# Patient Record
Sex: FEMALE | Race: BLACK OR AFRICAN AMERICAN | Employment: PART TIME | URBAN - METROPOLITAN AREA
[De-identification: names, ages, dates, MRNs, and addresses within clinical notes are randomized per-mention and may not be internally consistent; named-entity substitution may affect disease eponyms.]

---

## 2018-05-14 ENCOUNTER — HOSPITAL ENCOUNTER (OUTPATIENT)
Dept: PREADMISSION TESTING | Age: 43
Discharge: HOME | End: 2018-05-14
Payer: MEDICARE

## 2018-05-14 DIAGNOSIS — E10.9 DIABETES MELLITUS TYPE I (HCC): ICD-10-CM

## 2018-05-14 LAB
ALBUMIN SERPL-MCNC: 3.3 G/DL (ref 3.4–5)
ALBUMIN/GLOB SERPL: 0.8 {RATIO} (ref 0.8–1.7)
ALP SERPL-CCNC: 103 U/L (ref 45–117)
ALT SERPL-CCNC: 10 U/L (ref 13–56)
ANION GAP SERPL CALC-SCNC: 8 MMOL/L (ref 3–18)
AST SERPL-CCNC: 11 U/L (ref 15–37)
BILIRUB SERPL-MCNC: 0.5 MG/DL (ref 0.2–1)
BUN SERPL-MCNC: 14 MG/DL (ref 7–18)
BUN/CREAT SERPL: 16 (ref 12–20)
CALCIUM SERPL-MCNC: 8.2 MG/DL (ref 8.5–10.1)
CHLORIDE SERPL-SCNC: 106 MMOL/L (ref 100–108)
CO2 SERPL-SCNC: 26 MMOL/L (ref 21–32)
CREAT SERPL-MCNC: 0.85 MG/DL (ref 0.6–1.3)
EST. AVERAGE GLUCOSE BLD GHB EST-MCNC: 117 MG/DL
GLOBULIN SER CALC-MCNC: 4.2 G/DL (ref 2–4)
GLUCOSE SERPL-MCNC: 169 MG/DL (ref 74–99)
HBA1C MFR BLD: 5.7 % (ref 4.5–5.6)
HCT VFR BLD AUTO: 33.4 % (ref 35–45)
HGB BLD-MCNC: 10.4 G/DL (ref 12–16)
MCH RBC QN AUTO: 23.6 PG (ref 24–34)
MCHC RBC AUTO-ENTMCNC: 31.1 G/DL (ref 31–37)
MCV RBC AUTO: 75.9 FL (ref 74–97)
PLATELET # BLD AUTO: 272 K/UL (ref 135–420)
PMV BLD AUTO: 9.9 FL (ref 9.2–11.8)
POTASSIUM SERPL-SCNC: 3.7 MMOL/L (ref 3.5–5.5)
PROT SERPL-MCNC: 7.5 G/DL (ref 6.4–8.2)
RBC # BLD AUTO: 4.4 M/UL (ref 4.2–5.3)
SODIUM SERPL-SCNC: 140 MMOL/L (ref 136–145)
WBC # BLD AUTO: 9.3 K/UL (ref 4.6–13.2)

## 2018-05-14 PROCEDURE — 36415 COLL VENOUS BLD VENIPUNCTURE: CPT | Performed by: ORTHOPAEDIC SURGERY

## 2018-05-14 PROCEDURE — 85027 COMPLETE CBC AUTOMATED: CPT | Performed by: ORTHOPAEDIC SURGERY

## 2018-05-14 PROCEDURE — 83036 HEMOGLOBIN GLYCOSYLATED A1C: CPT | Performed by: ORTHOPAEDIC SURGERY

## 2018-05-14 PROCEDURE — 80053 COMPREHEN METABOLIC PANEL: CPT | Performed by: ORTHOPAEDIC SURGERY

## 2018-05-14 PROCEDURE — 93005 ELECTROCARDIOGRAM TRACING: CPT

## 2018-05-15 LAB
ATRIAL RATE: 68 BPM
CALCULATED P AXIS, ECG09: 67 DEGREES
CALCULATED R AXIS, ECG10: -48 DEGREES
CALCULATED T AXIS, ECG11: 152 DEGREES
DIAGNOSIS, 93000: NORMAL
P-R INTERVAL, ECG05: 164 MS
Q-T INTERVAL, ECG07: 440 MS
QRS DURATION, ECG06: 90 MS
QTC CALCULATION (BEZET), ECG08: 467 MS
VENTRICULAR RATE, ECG03: 68 BPM

## 2018-05-16 PROBLEM — F32.A DEPRESSION: Chronic | Status: ACTIVE | Noted: 2018-05-16

## 2018-05-16 PROBLEM — G62.9 PERIPHERAL NEUROPATHY: Chronic | Status: ACTIVE | Noted: 2018-05-16

## 2018-05-16 PROBLEM — G56.21 CUBITAL TUNNEL SYNDROME ON RIGHT: Chronic | Status: ACTIVE | Noted: 2018-05-16

## 2018-05-16 PROBLEM — K21.9 ACID REFLUX: Chronic | Status: ACTIVE | Noted: 2018-05-16

## 2018-05-16 PROBLEM — I10 HTN (HYPERTENSION): Chronic | Status: ACTIVE | Noted: 2018-05-16

## 2018-05-16 PROBLEM — G56.01 CARPAL TUNNEL SYNDROME OF RIGHT WRIST: Chronic | Status: ACTIVE | Noted: 2018-05-16

## 2018-05-16 PROBLEM — J45.909 ASTHMA: Chronic | Status: ACTIVE | Noted: 2018-05-16

## 2018-05-16 PROBLEM — E78.00 HIGH CHOLESTEROL: Chronic | Status: ACTIVE | Noted: 2018-05-16

## 2018-05-16 PROBLEM — F41.9 ANXIETY: Chronic | Status: ACTIVE | Noted: 2018-05-16

## 2018-05-16 RX ORDER — SODIUM CHLORIDE, SODIUM LACTATE, POTASSIUM CHLORIDE, CALCIUM CHLORIDE 600; 310; 30; 20 MG/100ML; MG/100ML; MG/100ML; MG/100ML
125 INJECTION, SOLUTION INTRAVENOUS CONTINUOUS
Status: CANCELLED | OUTPATIENT
Start: 2018-05-16 | End: 2018-05-17

## 2018-05-16 NOTE — H&P
History and Physical        Patient: Xenia Jenkins               Sex: female          DOA: (Not on file)         YOB: 1975      Age:  39 y.o.        LOS:  LOS: 0 days        HPI:     Mally Lerner is a 39year old right-handed  female referred here today for a right severe carpal tunnel syndrome/cubital tunnel syndrome/diabetic neuropathy. The patient has been having paresthesias in the right upper extremity now that has not gotten better with relative rest.  The patient has had recent EMGs by Dr. Naina Brown showing a right moderately severe carpal tunnel syndrome and a right ulnar nerve entrapment with axonal loss. The patient also seems to have diabetic neuropathy in the right upper extremity. She finds it does wake her up at night and she has to shake her arm to try to wake it up and she feels it in all five digits. No past medical history on file. No past surgical history on file. No family history on file. Social History     Social History    Marital status:      Spouse name: N/A    Number of children: N/A    Years of education: N/A     Social History Main Topics    Smoking status: Not on file    Smokeless tobacco: Not on file    Alcohol use Not on file    Drug use: Not on file    Sexual activity: Not on file     Other Topics Concern    Not on file     Social History Narrative       Prior to Admission medications    Not on File       Allergies not on file    Review of Systems    Pertinent positives include chest pain, headache, weight change, anxiety, changes in mood, depression, joint pain, muscle weakness, nausea/vomiting and numbness/tingling.   Pertinent negatives include blurred vision, chills, cold, discharge of the eyes, dizziness, double vision, fever, hearing loss, heart murmur, itching of the eyes, palpitations, redness of the eyes, rheumatic fever, ringing in ears, sore throat/hoarseness, abdominal pain, bipolar disorder, bladder/kidney infection, bloody stool, blood in urine, burning sensation, chronic cough, diarrhea, difficulty breathing, difficulty swallowing, fainting, frequent urinating, fracture/dislocation, gas/bloating, gout, hemorrhoids, incontinence, joint stiffness, loss of balance, memory loss, pain on breathing, painful urination, rheumatoid disease, seizure disorder, shortness of breath, sprain/strain, swelling of feet, tendonitis and wheezing. Physical Exam:      There were no vitals taken for this visit. Physical Exam:  Physical exam shows a healthy appearing 39year old [facility] female. The right elbow has a positive Tinel's in the cubital tunnel that is refereed to the 5th finger. She has a positive Tinel's and Phalen's into the hand in digits 1, 2 and 3. She has decreased light touch sensation in all five digits. Physical examination shows that the patients right hand demonstrates no obvious swelling, ecchymosis, or wounds noted. There is no tenderness to palpation anywhere within the wrist or hand. The patient has normal motion in all six directions. The patient has a negative direct carpal compression tests. The patient has a negative Finkelstein maneuver. The patient has good capillary refill. The patient has good  strength of the hand with normal thenar eminence tone. Assessment/Plan     Principal Problem:    Carpal tunnel syndrome of right wrist (5/16/2018)    Active Problems:    High cholesterol (5/16/2018)      Peripheral neuropathy (5/16/2018)      Asthma (5/16/2018)      Anxiety (5/16/2018)      Acid reflux (5/16/2018)      Depression (5/16/2018)      HTN (hypertension) (5/16/2018)      Cubital tunnel syndrome on right (5/16/2018)      Dr. Og Holley asked her to see his  for a right ECTR and right ECuTR. We will use the [employer] system for the hand and the segway system for the right elbow.   She understands the risks, the alternatives, and the benefits including infection, pain, bleeding, and is willing to proceed. 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 He issued [place] to her pharmacy for postoperative pain use.

## 2018-05-17 ENCOUNTER — ANESTHESIA (OUTPATIENT)
Dept: SURGERY | Age: 43
End: 2018-05-17
Payer: MEDICARE

## 2018-05-17 ENCOUNTER — ANESTHESIA EVENT (OUTPATIENT)
Dept: SURGERY | Age: 43
End: 2018-05-17
Payer: MEDICARE

## 2018-05-17 ENCOUNTER — HOSPITAL ENCOUNTER (OUTPATIENT)
Age: 43
Setting detail: OUTPATIENT SURGERY
Discharge: HOME | End: 2018-05-17
Attending: ORTHOPAEDIC SURGERY | Admitting: ORTHOPAEDIC SURGERY
Payer: MEDICARE

## 2018-05-17 VITALS
TEMPERATURE: 98 F | DIASTOLIC BLOOD PRESSURE: 68 MMHG | BODY MASS INDEX: 38.43 KG/M2 | WEIGHT: 239.13 LBS | HEIGHT: 66 IN | HEART RATE: 74 BPM | RESPIRATION RATE: 16 BRPM | SYSTOLIC BLOOD PRESSURE: 140 MMHG | OXYGEN SATURATION: 98 %

## 2018-05-17 PROBLEM — G56.00 CTS (CARPAL TUNNEL SYNDROME): Status: ACTIVE | Noted: 2018-05-17

## 2018-05-17 LAB
GLUCOSE BLD STRIP.AUTO-MCNC: 126 MG/DL (ref 70–110)
GLUCOSE BLD STRIP.AUTO-MCNC: 138 MG/DL (ref 70–110)
GLUCOSE BLD STRIP.AUTO-MCNC: 157 MG/DL (ref 70–110)
HCG SERPL QL: NEGATIVE

## 2018-05-17 PROCEDURE — 77030020782 HC GWN BAIR PAWS FLX 3M -B: Performed by: ORTHOPAEDIC SURGERY

## 2018-05-17 PROCEDURE — 84703 CHORIONIC GONADOTROPIN ASSAY: CPT | Performed by: ORTHOPAEDIC SURGERY

## 2018-05-17 PROCEDURE — 77030012508 HC MSK AIRWY LMA AMBU -A: Performed by: ANESTHESIOLOGY

## 2018-05-17 PROCEDURE — 82962 GLUCOSE BLOOD TEST: CPT

## 2018-05-17 PROCEDURE — 36415 COLL VENOUS BLD VENIPUNCTURE: CPT | Performed by: ORTHOPAEDIC SURGERY

## 2018-05-17 PROCEDURE — 76210000006 HC OR PH I REC 0.5 TO 1 HR: Performed by: ORTHOPAEDIC SURGERY

## 2018-05-17 PROCEDURE — 77030009770 HC INSTRU CRPL SET CNMD -C: Performed by: ORTHOPAEDIC SURGERY

## 2018-05-17 PROCEDURE — 74011250636 HC RX REV CODE- 250/636: Performed by: ORTHOPAEDIC SURGERY

## 2018-05-17 PROCEDURE — 74011000250 HC RX REV CODE- 250: Performed by: ORTHOPAEDIC SURGERY

## 2018-05-17 PROCEDURE — 76210000026 HC REC RM PH II 1 TO 1.5 HR: Performed by: ORTHOPAEDIC SURGERY

## 2018-05-17 PROCEDURE — 77030000032 HC CUF TRNQT ZIMM -B: Performed by: ORTHOPAEDIC SURGERY

## 2018-05-17 PROCEDURE — 74011250637 HC RX REV CODE- 250/637: Performed by: ANESTHESIOLOGY

## 2018-05-17 PROCEDURE — 74011250636 HC RX REV CODE- 250/636

## 2018-05-17 PROCEDURE — 77030011640 HC PAD GRND REM COVD -A: Performed by: ORTHOPAEDIC SURGERY

## 2018-05-17 PROCEDURE — 76060000032 HC ANESTHESIA 0.5 TO 1 HR: Performed by: ORTHOPAEDIC SURGERY

## 2018-05-17 PROCEDURE — 76010000138 HC OR TIME 0.5 TO 1 HR: Performed by: ORTHOPAEDIC SURGERY

## 2018-05-17 PROCEDURE — 74011000250 HC RX REV CODE- 250

## 2018-05-17 PROCEDURE — 74011636637 HC RX REV CODE- 636/637: Performed by: ANESTHESIOLOGY

## 2018-05-17 PROCEDURE — 77030020268 HC MISC GENERAL SUPPLY: Performed by: ORTHOPAEDIC SURGERY

## 2018-05-17 RX ORDER — MIDAZOLAM HYDROCHLORIDE 1 MG/ML
INJECTION, SOLUTION INTRAMUSCULAR; INTRAVENOUS AS NEEDED
Status: DISCONTINUED | OUTPATIENT
Start: 2018-05-17 | End: 2018-05-17 | Stop reason: HOSPADM

## 2018-05-17 RX ORDER — DIPHENHYDRAMINE HYDROCHLORIDE 50 MG/ML
12.5 INJECTION, SOLUTION INTRAMUSCULAR; INTRAVENOUS
Status: DISCONTINUED | OUTPATIENT
Start: 2018-05-17 | End: 2018-05-17 | Stop reason: HOSPADM

## 2018-05-17 RX ORDER — LIDOCAINE HYDROCHLORIDE 20 MG/ML
INJECTION, SOLUTION EPIDURAL; INFILTRATION; INTRACAUDAL; PERINEURAL AS NEEDED
Status: DISCONTINUED | OUTPATIENT
Start: 2018-05-17 | End: 2018-05-17 | Stop reason: HOSPADM

## 2018-05-17 RX ORDER — SODIUM CHLORIDE 0.9 % (FLUSH) 0.9 %
5-10 SYRINGE (ML) INJECTION AS NEEDED
Status: DISCONTINUED | OUTPATIENT
Start: 2018-05-17 | End: 2018-05-17 | Stop reason: HOSPADM

## 2018-05-17 RX ORDER — FENTANYL CITRATE 50 UG/ML
INJECTION, SOLUTION INTRAMUSCULAR; INTRAVENOUS AS NEEDED
Status: DISCONTINUED | OUTPATIENT
Start: 2018-05-17 | End: 2018-05-17 | Stop reason: HOSPADM

## 2018-05-17 RX ORDER — INSULIN LISPRO 100 [IU]/ML
INJECTION, SOLUTION INTRAVENOUS; SUBCUTANEOUS ONCE
Status: DISCONTINUED | OUTPATIENT
Start: 2018-05-17 | End: 2018-05-17 | Stop reason: HOSPADM

## 2018-05-17 RX ORDER — DEXTROSE 50 % IN WATER (D50W) INTRAVENOUS SYRINGE
25-50 AS NEEDED
Status: DISCONTINUED | OUTPATIENT
Start: 2018-05-17 | End: 2018-05-17 | Stop reason: HOSPADM

## 2018-05-17 RX ORDER — ONDANSETRON 2 MG/ML
INJECTION INTRAMUSCULAR; INTRAVENOUS AS NEEDED
Status: DISCONTINUED | OUTPATIENT
Start: 2018-05-17 | End: 2018-05-17 | Stop reason: HOSPADM

## 2018-05-17 RX ORDER — DIPHENHYDRAMINE HCL 25 MG
25 CAPSULE ORAL
Status: DISCONTINUED | OUTPATIENT
Start: 2018-05-17 | End: 2018-05-17 | Stop reason: HOSPADM

## 2018-05-17 RX ORDER — SODIUM CHLORIDE, SODIUM LACTATE, POTASSIUM CHLORIDE, CALCIUM CHLORIDE 600; 310; 30; 20 MG/100ML; MG/100ML; MG/100ML; MG/100ML
150 INJECTION, SOLUTION INTRAVENOUS CONTINUOUS
Status: DISCONTINUED | OUTPATIENT
Start: 2018-05-17 | End: 2018-05-17 | Stop reason: HOSPADM

## 2018-05-17 RX ORDER — SODIUM CHLORIDE 0.9 % (FLUSH) 0.9 %
5-10 SYRINGE (ML) INJECTION EVERY 8 HOURS
Status: DISCONTINUED | OUTPATIENT
Start: 2018-05-17 | End: 2018-05-17 | Stop reason: HOSPADM

## 2018-05-17 RX ORDER — SODIUM CHLORIDE, SODIUM LACTATE, POTASSIUM CHLORIDE, CALCIUM CHLORIDE 600; 310; 30; 20 MG/100ML; MG/100ML; MG/100ML; MG/100ML
125 INJECTION, SOLUTION INTRAVENOUS CONTINUOUS
Status: DISCONTINUED | OUTPATIENT
Start: 2018-05-17 | End: 2018-05-17 | Stop reason: HOSPADM

## 2018-05-17 RX ORDER — NALOXONE HYDROCHLORIDE 0.4 MG/ML
0.1 INJECTION, SOLUTION INTRAMUSCULAR; INTRAVENOUS; SUBCUTANEOUS AS NEEDED
Status: DISCONTINUED | OUTPATIENT
Start: 2018-05-17 | End: 2018-05-17 | Stop reason: HOSPADM

## 2018-05-17 RX ORDER — OXYCODONE HYDROCHLORIDE 5 MG/1
5 TABLET ORAL ONCE
Status: COMPLETED | OUTPATIENT
Start: 2018-05-17 | End: 2018-05-17

## 2018-05-17 RX ORDER — PROPOFOL 10 MG/ML
INJECTION, EMULSION INTRAVENOUS AS NEEDED
Status: DISCONTINUED | OUTPATIENT
Start: 2018-05-17 | End: 2018-05-17 | Stop reason: HOSPADM

## 2018-05-17 RX ORDER — ALBUTEROL SULFATE 0.83 MG/ML
2.5 SOLUTION RESPIRATORY (INHALATION) AS NEEDED
Status: DISCONTINUED | OUTPATIENT
Start: 2018-05-17 | End: 2018-05-17 | Stop reason: HOSPADM

## 2018-05-17 RX ORDER — FENTANYL CITRATE 50 UG/ML
25 INJECTION, SOLUTION INTRAMUSCULAR; INTRAVENOUS AS NEEDED
Status: DISCONTINUED | OUTPATIENT
Start: 2018-05-17 | End: 2018-05-17 | Stop reason: HOSPADM

## 2018-05-17 RX ORDER — BUPIVACAINE HYDROCHLORIDE 2.5 MG/ML
INJECTION, SOLUTION EPIDURAL; INFILTRATION; INTRACAUDAL AS NEEDED
Status: DISCONTINUED | OUTPATIENT
Start: 2018-05-17 | End: 2018-05-17 | Stop reason: HOSPADM

## 2018-05-17 RX ORDER — MAGNESIUM SULFATE 100 %
4 CRYSTALS MISCELLANEOUS AS NEEDED
Status: DISCONTINUED | OUTPATIENT
Start: 2018-05-17 | End: 2018-05-17 | Stop reason: HOSPADM

## 2018-05-17 RX ORDER — INSULIN LISPRO 100 [IU]/ML
INJECTION, SOLUTION INTRAVENOUS; SUBCUTANEOUS ONCE
Status: COMPLETED | OUTPATIENT
Start: 2018-05-17 | End: 2018-05-17

## 2018-05-17 RX ORDER — ONDANSETRON 2 MG/ML
4 INJECTION INTRAMUSCULAR; INTRAVENOUS ONCE
Status: DISCONTINUED | OUTPATIENT
Start: 2018-05-17 | End: 2018-05-17 | Stop reason: HOSPADM

## 2018-05-17 RX ORDER — HYDROCODONE BITARTRATE AND ACETAMINOPHEN 5; 325 MG/1; MG/1
1 TABLET ORAL AS NEEDED
Status: DISCONTINUED | OUTPATIENT
Start: 2018-05-17 | End: 2018-05-17 | Stop reason: HOSPADM

## 2018-05-17 RX ADMIN — PROPOFOL 200 MG: 10 INJECTION, EMULSION INTRAVENOUS at 11:56

## 2018-05-17 RX ADMIN — INSULIN LISPRO 2 UNITS: 100 INJECTION, SOLUTION INTRAVENOUS; SUBCUTANEOUS at 09:39

## 2018-05-17 RX ADMIN — LIDOCAINE HYDROCHLORIDE 60 MG: 20 INJECTION, SOLUTION EPIDURAL; INFILTRATION; INTRACAUDAL; PERINEURAL at 11:56

## 2018-05-17 RX ADMIN — SODIUM CHLORIDE, SODIUM LACTATE, POTASSIUM CHLORIDE, AND CALCIUM CHLORIDE 125 ML/HR: 600; 310; 30; 20 INJECTION, SOLUTION INTRAVENOUS at 08:49

## 2018-05-17 RX ADMIN — MIDAZOLAM HYDROCHLORIDE 2 MG: 1 INJECTION, SOLUTION INTRAMUSCULAR; INTRAVENOUS at 11:51

## 2018-05-17 RX ADMIN — ONDANSETRON 4 MG: 2 INJECTION INTRAMUSCULAR; INTRAVENOUS at 12:07

## 2018-05-17 RX ADMIN — OXYCODONE HYDROCHLORIDE 5 MG: 5 TABLET ORAL at 13:57

## 2018-05-17 RX ADMIN — FENTANYL CITRATE 100 MCG: 50 INJECTION, SOLUTION INTRAMUSCULAR; INTRAVENOUS at 11:51

## 2018-05-17 NOTE — PERIOP NOTES
TRANSFER - OUT REPORT:    Verbal report given to JOHN Perez on Terrell Matta  being transferred to Phase II for routine progression of care       Report consisted of patients Situation, Background, Assessment and   Recommendations(SBAR). Information from the following report(s) SBAR was reviewed with the receiving nurse. Lines:   Peripheral IV 05/17/18 Left Hand (Active)   Site Assessment Clean, dry, & intact 5/17/2018 12:47 PM   Phlebitis Assessment 0 5/17/2018 12:47 PM   Infiltration Assessment 0 5/17/2018 12:47 PM   Dressing Status Clean, dry, & intact 5/17/2018 12:47 PM   Dressing Type Transparent;Tape 5/17/2018 12:47 PM   Hub Color/Line Status Pink; Infusing 5/17/2018 12:47 PM        Opportunity for questions and clarification was provided.       Patient transported with:   [employer]

## 2018-05-17 NOTE — OP NOTES
ENDOSCOPIC CARPAL & CUBITAL TUNNEL  RELEASE     Patient: Pratibha Laughlin MRN: [id number]  SSN: [id number]    YOB: 1975  Age: 39 y.o. Sex: female          Date of Procedure: 5/17/2018     Preoperative Diagnosis:  Carpal & Cubital Tunnel Syndrome    Postoperative Diagnosis:  Carpal & Cubital Tunnel Syndrome     Procedure: right Endoscopic Carpal & Cubital Tunnel Release    Surgeon:  Agustín Dias III, MD     Assistant: Mickie Fuentes PA-C    Anesthesia: General    Estimated Blood Loss: Minimal    Tourniquet Time:   16   minutes at 250mmHg. Specimens: None    Complications: None    Indications: This is a 39y.o. year-old female who presents with known Carpal & Cubital Tunnel Syndrome documented by EMGs and clinical exam who now presents for surgical correction due to inability to treat the patients problem conservatively. PROCEDURE:The patient was brought to the  operating theater and after adequate anesthesia, the correct upper  extremity was prepped and draped in the typical sterile fashion. The  limb was exsanguinated with an Esmarch bandage and the tourniquet  inflated to 250 mmHg. A 1 inch long straight incision was placed just  behind the medial epicondyle over the cubital tunnel. The incision was  taken down to the subcutaneous tissue to the fascia just above the  ulnar nerve. Using a curved tenotomy scissors the soft tissue  was dissected off the superficial fascia distally and proximally and then  I used [place] scissors to dissect soft tissue off the fascia superficially  for a distance of about 8 cm distally and 8 cm proximally. I went back  to the cubital tunnel and using the pickups and tenotomy scissors, I  opened up the sheath around the ulnar nerve( Osbornes fascia). Dissection was carried  out circumferentially and it was freed in the cubital tunnel.  I used the  large dilator on the [employer] instrumentation system and dilated  the sheath for the ulnar nerve distally to the full depth and  proximally to the full depth. I then assembled the upper and lower  endoscopic cubital tunnel release guides and put lower part underneath  the sheath of the cubital tunnel and once it was underneath the upper  guide was then on top of the fascia. This was then advanced down  until it was fully seated. I placed the scope in the upper guide and  viewed the fascia from above, which showed good view of the fascia  entirely. I then viewed from the lower guide and looked upwards at the  fascia which showed the fascia throughout as well with no nerve  entrapment. I then rotated the cannula around and through the small  slit in the back you could see the ulnar nerve protected and out of  harm's way. The antegrade knife was then placed in the knife channel  of the guide and advancing the scope at the same time as knife it was  advanced completely to the distal extent with full release of the fascia. The guide was then removed and the exact same technique was  carried out proximally going in the upper arm. Once it was released  fully I could place the guide system back in the same area with no  tension as there was before. There was no subluxation of the ulnar  nerve with flexion. The wound was then irrigated out. The skin was  closed with inverted 4-0 Monocryl, and then Mastisol was applied to  the skin and half-inch Steri-Strips were applied. Approximately 10 mL  of 0.25% Marcaine with epinephrine was injected in the skin, distal and  proximal. This was then dressed with 4 x 4's and an Ace wrap. A 1 cm transverse incision was placed at the volar flexion wrist crease centered over the palmaris longus. The incision was deepened to the antebrachial fascia which was released the length of the wound and proximally for an additional 1 cm. Using the [facility] carpal tunnel release kit, the carpal tunnel was dilated in line with the fourth metacarpal to the distal extent of the transverse carpal ligament.   The cannula was then inserted and kept on some proximal radial deviation with the wrist in slight extension, and it was passed all the way to the cardinal line of Castro. The scope was then inserted with the wrist in slight extension and the undersurface of the transverse carpal ligament was seen easily from proximal to the distal fatty/adipose tissue at the end of the transverse carpal ligament. Using the [facility] on the [facility] set and using the endoscope for visualization, the transverse carpal ligament was transected in two passes with good release of the ligament outside of view of the cannula. The nerve was never in harms way. The cannula was then withdrawn and placed back in the carpal tunnel with none of the prerelease tension. The wound was then closed with Mastisol on either side and then Steri-Strips were used to re-oppose the skin without the use of stitches. The skin and cut ends of the transverse carpal ligament were anesthetized with 4 to 5 mL of 0.25% Marcaine without epinephrine. This was dressed with two 4 x 4s and an Ace wrap. The tourniquet was deflated and removed, and the patient taken to the recovery room awake and in stable condition. All instrument, sponge and needle counts were correct.

## 2018-05-17 NOTE — INTERVAL H&P NOTE
H&P Update:  Clarence Montana was seen and examined. History and physical has been reviewed. The patient has been examined. There have been no significant clinical changes since the completion of the originally dated History and Physical.  Patient identified by surgeon; surgical site was confirmed by patient and surgeon.     Signed By: Nirav Blanco MD     May 17, 2018 9:43 AM

## 2018-05-17 NOTE — ANESTHESIA PREPROCEDURE EVALUATION
Anesthetic History   No history of anesthetic complications            Review of Systems / Medical History  Patient summary reviewed, nursing notes reviewed and pertinent labs reviewed    Pulmonary            Asthma : well controlled       Neuro/Psych         Psychiatric history     Cardiovascular    Hypertension      CHF        Exercise tolerance: >4 METS     GI/Hepatic/Renal     GERD           Endo/Other    Diabetes: type 2    Arthritis     Other Findings            Physical Exam    Airway  Mallampati: II  TM Distance: 4 - 6 cm  Neck ROM: normal range of motion   Mouth opening: Normal     Cardiovascular  Regular rate and rhythm,  S1 and S2 normal,  no murmur, click, rub, or gallop             Dental  No notable dental hx       Pulmonary  Breath sounds clear to auscultation               Abdominal  GI exam deferred       Other Findings            Anesthetic Plan    ASA: 3  Anesthesia type: general          Induction: Intravenous  Anesthetic plan and risks discussed with: Patient

## 2018-05-17 NOTE — PERIOP NOTES
Reviewed PTA medication list with patient/caregiver and patient/caregiver denies any additional medications. Patient admits to having a responsible adult care for them for at least 24 hours after surgery.     Dual skin assessment completed by Prosper LOPEZ and DOREEN Layton RN.

## 2018-05-17 NOTE — ANESTHESIA POSTPROCEDURE EVALUATION
Post-Anesthesia Evaluation & Assessment    Visit Vitals    /77    Pulse 78    Temp 36.9 °C (98.5 °F)    Resp 20    Ht 5' 6\" (1.676 m)    Wt 108.5 kg (239 lb 2 oz)    SpO2 96%    BMI 38.6 kg/m2       Nausea/Vomiting: no nausea    Post-operative hydration adequate. Pain score (VAS): 2    Mental status & Level of consciousness: alert and oriented x 3    Neurological status: moves all extremities, sensation grossly intact    Pulmonary status: airway patent, no supplemental oxygen required    Complications related to anesthesia: none    Patient has met all discharge requirements.     Additional comments:        Yelena Byrd MD

## 2018-05-17 NOTE — PERIOP NOTES
TRANSFER - IN REPORT:    Verbal report received from OR [facility]culator on Thalia Koffi  being received from OR for routine post - op      Report consisted of patients Situation, Background, Assessment and   Recommendations(SBAR). Information from the following report(s) SBAR was reviewed with the receiving nurse. Opportunity for questions and clarification was provided. Assessment completed upon patients arrival to unit and care assumed.

## 2018-05-17 NOTE — IP AVS SNAPSHOT
[address] [address] 
[phone] Patient: Xenia Jenkins MRN: [id number] About your hospitalization You were admitted on:  May 17, 2018 [facility] You were discharged on:  May 17, 2018 Why you were hospitalized Your primary diagnosis was:  Carpal Tunnel Syndrome Of Right Wrist  
 Your diagnoses also included:  High Cholesterol, Peripheral Neuropathy, Asthma, Anxiety, Acid Reflux, Depression, Htn (Hypertension), Cubital Tunnel Syndrome On Right, Cts (Carpal Tunnel Syndrome) Follow-up Information Follow up With Details Comments Contact Info Celestina Sy MD   [id number] [facility] A [address] [place] [address] 
[phone] Discharge Orders None A check esdras indicates which time of day the medication should be taken. My Medications CONTINUE taking these medications Instructions Each Dose to Equal  
 Morning Noon Evening Bedtime  
 aspirin delayed-release 81 mg tablet Your last dose was: Your next dose is: Take 81 mg by mouth daily. 81 mg  
    
   
   
   
  
 atorvastatin 40 mg tablet Commonly known as:  LIPITOR Your last dose was: Your next dose is: Take 40 mg by mouth daily. 40 mg  
    
   
   
   
  
 cyclobenzaprine 10 mg tablet Commonly known as:  FLEXERIL Your last dose was: Your next dose is: Take 10 mg by mouth nightly. 10 mg FLUoxetine 20 mg capsule Commonly known as:  PROzac Your last dose was: Your next dose is: Take 20 mg by mouth daily. 20 mg  
    
   
   
   
  
 furosemide 20 mg tablet Commonly known as:  LASIX Your last dose was: Your next dose is: Take 20 mg by mouth two (2) times a day.   
 20 mg  
    
   
   
   
  
 HumaLOG U-100 Insulin 100 unit/mL injection Generic drug:  insulin lispro Your last dose was: Your next dose is:    
   
   
 by SubCUTAneous route Before breakfast, lunch, and dinner. Sliding scale  
     
   
   
   
  
 hydroCHLOROthiazide 25 mg tablet Commonly known as:  HYDRODIURIL Your last dose was: Your next dose is: Take 25 mg by mouth daily. 25 mg  
    
   
   
   
  
 isosorbide mononitrate ER 30 mg tablet Commonly known as:  IMDUR Your last dose was: Your next dose is: Take 30 mg by mouth daily. 30 mg  
    
   
   
   
  
 LANTUS SOLOSTAR U-100 INSULIN 100 unit/mL (3 mL) Inpn Generic drug:  insulin glargine Your last dose was: Your next dose is:    
   
   
 42 Units by SubCUTAneous route nightly. 42 Units  
    
   
   
   
  
 lisinopril 40 mg tablet Commonly known as:  Chuck Littler Your last dose was: Your next dose is: Take 40 mg by mouth daily. 40 mg  
    
   
   
   
  
 metFORMIN 1,000 mg tablet Commonly known as:  GLUCOPHAGE Your last dose was: Your next dose is: Take 1,000 mg by mouth two (2) times daily (with meals). 1000 mg  
    
   
   
   
  
 nitroglycerin 0.4 mg SL tablet Commonly known as:  NITROSTAT Your last dose was: Your next dose is: 0.4 mg by SubLINGual route every five (5) minutes as needed for Chest Pain. Up to 3 doses. 0.4 mg  
    
   
   
   
  
 nortriptyline 50 mg capsule Commonly known as:  PAMELOR Your last dose was: Your next dose is: Take 100 mg by mouth two (2) times a day. 100 mg  
    
   
   
   
  
 NORVASC 10 mg tablet Generic drug:  amLODIPine Your last dose was: Your next dose is: Take 10 mg by mouth daily. 10 mg  
    
   
   
   
  
 OTHER Your last dose was: Your next dose is: Iron injection weekly PROAIR HFA 90 mcg/actuation inhaler Generic drug:  albuterol Your last dose was: Your next dose is: Take 2 Puffs by inhalation every four (4) hours as needed for Wheezing. 2 Puff SINGULAIR 10 mg tablet Generic drug:  montelukast  
   
Your last dose was: Your next dose is: Take 10 mg by mouth daily. 10 mg  
    
   
   
   
  
 spironolactone 25 mg tablet Commonly known as:  ALDACTONE Your last dose was: Your next dose is: Take 25 mg by mouth two (2) times a day. 25 mg  
    
   
   
   
  
 SYMBICORT 160-4.5 mcg/actuation Hfaa Generic drug:  budesonide-formoterol Your last dose was: Your next dose is: Take 2 Puffs by inhalation two (2) times a day. 2 Puff VITAMIN B-12 1,000 mcg sublingual tablet Generic drug:  cyanocobalamin Your last dose was: Your next dose is: Take 1,000 mcg by mouth daily. 1000 mcg Discharge Instructions Lora Buenrostro III, MD Cindra Bud, PA-C Upper Extremity Surgery Discharge Instructions Please take the time to review the following instructions before you leave the hospital and use them as guidelines during your recovery from surgery. If you have any questions you may contact my office at [phone]. Wound Care/Dressing Changes: 
 
[]   You may remove the bulky dressing two days after surgery. Once you remove this, no dressing is necessary if there is no drainage. [x]   You may change your dressing as needed. Beginning the 2 days after you are discharged from the hospital you should change your dressing daily. A big, bulky dressing isnt necessary as long as there is any drainage from the incisions.   You can put a band-aid or a piece of gauze over each incision and wear an ACE bandage as needed for comfort and swelling. []   Dont remove your dressing or get them wet. ? It isnt necessary to apply antibiotic ointment to your incisions. Sutures will be removed at your one week post-op visit. Staples (if you have them) are removed in two weeks. If you have steri-strips over your incision they will start to peel off in 7-10 days as you get them wet. They dont need to be removed prior to that. When they begin to peel off, you may remove them. They should all be removed by 14 days from your surgery. Showering/Bathing: 
 
[x]   You may shower 2 days after your surgery. Your dressing may be removed for showering. You may get your incisions wet in the shower. Dont vigorously scrub your incisions. Apply a clean, dry dressing after you have dried your incisions. Do not take a bath or get into a swimming pool or Jacuzzi until the incisions are completely healed. This may take about 14 days. Do not soak your incision under water. Sling: 
 
[x]   You are not required to wear your sling and should do so only as needed for comfort. You have no restrictions with regards to the movement of your shoulder. Please push to achieve full range of motion as soon as possible. You may resume your normal daily activities immediately and return to work as soon as you feel appropriate. 
 
[]   Keep your arm in the immobilizer at all times except when showering and changing your clothes. When showering or changing, keep your arm at your side. Dont move it away from your body. []   Keep your arm in the immobilizer at all times except when showering, changing your clothes and doing the exercises shown to you by Dr. Flory Mar or your physical therapist prior to your discharge from the hospital.  Keep your arm at your side when changing your clothes and showering. Dont move it away from your body. Ice/Elevation Continue ice consistently for 48 hours after surgery. After 48 hours, you should ice your shoulder 3 times per day, for 20 minutes at a time for the next 5 days. After one week from surgery, you may use ice as needed for pain and swelling. Diet: 
 
You may advance to your regular diet as tolerated. Medication: 
 
1. You will be given a prescription for pain medication when you are discharged from the hospital.  Take the medication as needed according to the directions on the prescription bottle. Possible side effects of the medication include dizziness, headache, nausea, vomiting, constipation and urinary retention. If you experience any of these side effects call the office so that we can assist you in relieving them. Discontinue the use of the pain medication if you develop itching, rash, shortness of breath or difficulties swallowing. If these symptoms become severe or arent relieved by discontinuing the medication you should seek immediate medical attention. Refills of pain medication are authorized during office hours only (8 AM-5PM Mon. thru Fri.). 2. If you were prescribed Percoset/oxycodone or Dilaudid/hydromorphone you must have a written prescription. These medications legally cannot be called in to the pharmacy. 3. You may take over the counter Ibuprofen/Advil/Aleve between dosages of your pain medication if needed. Do not take Tylenol in addition to your pain medication as most of the pain medication already contains Tylenol. Do not exceed 3000mg of Tylenol per day. Ex: (hydrocodone 5/325g= 325mg of Tylenol) 4. You may resume the medication you were taking prior to surgery. Pain medication may change the effects of any antidepressant medication you are taking. If you have any questions about possible interactions between your regular medications and the pain medication you should consult the physician who prescribes your regular medications. Follow Up Appointment: If you are unsure of your follow-up appointment date and time, please call [phone]. Please let our  know you are scheduling a post-op appointment. Most appointments should be between 7-14 days after your surgery. Physical Therapy: 
 
[]    If you already have a therapy appointment, please be sure to attend your sessions as scheduled. []   Physical Therapy will be discussed with you at your first follow-up appointment with Dr. Lizette Kaur. You dont need to begin physical therapy prior to that. 
 
[]  Begin physical therapy with your Home Health Physical Therapy. This will be set up         for your before you leave the hospital. 
 
[x]  You do not require Physical Therapy. Important Signs and Symptoms: 
 
If any of the following signs and symptoms occurs, you should contact Dr. Lizette Kaur office. Please be advised if a problem arises which you feel requires immediate medical attention or you are unable to contact Dr. Lizette Kaur office you should seek immediate medical attention. Signs and symptoms to watch for include: 1. A sudden increase in swelling and/or redness or warmth at the area your surgery was performed which isnt relieved by rest, ice, and elevation. 2. Oral temperature greater than 101 degrees for 12 hours or more which isnt relieved by an increase in fluid intake and taking two Tylenol every 4-6 hours. 3. Excessive drainage from your incisions, or drainage which hasnt stopped by 72 hours after your surgery. 4. Fever, chills, shortness of breath, chest pain, nausea, vomiting or other signs and symptoms which are of concern to you. DISCHARGE SUMMARY from Nurse PATIENT INSTRUCTIONS: 
 
 
F-face looks uneven A-arms unable to move or move unevenly S-speech slurred or non-existent T-time-call 911 as soon as signs and symptoms begin-DO NOT go Back to bed or wait to see if you get better-TIME IS BRAIN. Warning Signs of HEART ATTACK Call 911 if you have these symptoms: 
? Chest discomfort. Most heart attacks involve discomfort in the center of the chest that lasts more than a few minutes, or that goes away and comes back. It can feel like uncomfortable pressure, squeezing, fullness, or pain. ? Discomfort in other areas of the upper body. Symptoms can include pain or discomfort in one or both arms, the back, neck, jaw, or stomach. ? Shortness of breath with or without chest discomfort. ? Other signs may include breaking out in a cold sweat, nausea, or lightheadedness. Don't wait more than five minutes to call [address]! Fast action can save your life. Calling 911 is almost always the fastest way to get lifesaving treatment. Emergency Medical Services staff can begin treatment when they arrive  up to an hour sooner than if someone gets to the hospital by car. Patient armband removed and shredded The discharge information has been reviewed with the patient and caregiver. The patient and caregiver verbalized understanding. Discharge medications reviewed with the patient and caregiver and appropriate educational materials and side effects teaching were provided. ___________________________________________________________________________________________________________________________________ Introducing [facility]! [facility] introduces Couch[employer].com patient portal. Now you can access parts of your medical record, email your doctor's office, and request medication refills online. 1. In your internet browser, go to [email or web address] "[email or web address]/[email or web address] 2. Click on the First Time User? Click Here link in the Sign In box. You will see the New Member Sign Up page. 3. Enter your [employer] Access Code exactly as it appears below. You will not need to use this code after youve completed the sign-up process. If you do not sign up before the expiration date, you must request a new code. · [employer] Access Code: [email or web address] Expires: 8/14/2018  9:29 AM 
 
4. Enter the last four digits of your Social Security Number (xxxx) and Date of Birth (mm/dd/yyyy) as indicated and click Submit. You will be taken to the next sign-up page. 5. Create a [email or web address] ID. This will be your [employer] login ID and cannot be changed, so think of one that is secure and easy to remember. 6. Create a [employer] password. You can change your password at any time. 7. Enter your Password Reset Question and Answer. This can be used at a later time if you forget your password. 8. Enter your e-mail address. You will receive e-mail notification when new information is available in [address]. 9. Click Sign Up. You can now view and download portions of your medical record. 10. Click the Download Summary menu link to download a portable copy of your medical information. If you have questions, please visit the Frequently Asked Questions section of the [employer] website. Remember, [employer] is NOT to be used for urgent needs. For medical emergencies, dial 911. Now available from your iPhone and Android! Introducing Rosendo Santoro As a [facility] patient, I wanted to make you aware of our electronic visit tool called Rosendo Hugojulian. [employer] 24/7 allows you to connect within minutes with a medical provider 24 hours a day, seven days a week via a mobile device or tablet or logging into a secure website from your computer. You can access Rosendo Ariaslubnafin from anywhere in the [place].  
 
A virtual visit might be right for you when you have a simple condition and feel like you just dont want to get out of bed, or cant get away from work for an appointment, when your regular [employer] provider is not available (evenings, weekends or holidays), or when youre out of town and need minor care. Electronic visits cost only $49 and if the [employer] 24/7 provider determines a prescription is needed to treat your condition, one can be electronically transmitted to a nearby pharmacy*. Please take a moment to enroll today if you have not already done so. The enrollment process is free and takes just a few minutes. To enroll, please download the [employer] 24/7 yelena to your tablet or phone, or visit [email or web address] to enroll on your computer. And, as an [address] [place] [address] patient with a [employer] account, the results of your visits will be scanned into your electronic medical record and your primary care provider will be able to view the scanned results. We urge you to continue to see your regular [employer] provider for your ongoing medical care. And while your primary care provider may not be the one available when you seek a [employer] virtual visit, the peace of mind you get from getting a real diagnosis real time can be priceless. For more information on [employer], view our Frequently Asked Questions (FAQs) at [email or web address]. Sincerely, 
 
Marion Caba MD 
Chief Medical Officer [address] *:  certain medications cannot be prescribed via [employer] Providers Seen During Your Hospitalization Provider Specialty Primary office phone Lacie Silveira, [place] Orthopedic Surgery [phone] Your Primary Care Physician (PCP) Primary Care Physician Office Phone Office Fax Aneta Parrish [phone] You are allergic to the following Allergen Reactions Penicillins Anaphylaxis Tomato Hives Recent Documentation Height Weight BMI OB Status Smoking Status 1.676 m 108.5 kg 38.6 kg/m2 Having regular periods Former Smoker Emergency Contacts Name Discharge Info Relation Home Work Mobile Elier Tim DISCHARGE CAREGIVER [3] Other Relative [6]   [phone] DuniaRegi DISCHARGE CAREGIVER [3] Mother [14]   [phone] Patient Belongings The following personal items are in your possession at time of discharge: 
  Dental Appliances: None         Home Medications: None   Jewelry: None  Clothing: Footwear, Dress, Undergarments, Sweater (7)    Other Valuables: None Please provide this summary of care documentation to your next provider. Signatures-by signing, you are acknowledging that this After Visit Summary has been reviewed with you and you have received a copy. Patient Signature:  ____________________________________________________________ Date:  ____________________________________________________________  
  
Soco Piety Provider Signature:  ____________________________________________________________ Date:  ____________________________________________________________

## 2018-05-17 NOTE — DISCHARGE INSTRUCTIONS
Randi Francis III, MD Kae Miller, PA-C    Upper Extremity Surgery  Discharge Instructions      Please take the time to review the following instructions before you leave the hospital and use them as guidelines during your recovery from surgery. If you have any questions you may contact my office at [phone]. Wound Care/Dressing Changes:    []   You may remove the bulky dressing two days after surgery. Once you remove this, no dressing is necessary if there is no drainage. [x]   You may change your dressing as needed. Beginning the 2 days after you are discharged from the hospital you should change your dressing daily. A big, bulky dressing isnt necessary as long as there is any drainage from the incisions. You can put a band-aid or a piece of gauze over each incision and wear an ACE bandage as needed for comfort and swelling. []   Dont remove your dressing or get them wet.  It isnt necessary to apply antibiotic ointment to your incisions. Sutures will be removed at your one week post-op visit. Staples (if you have them) are removed in two weeks. If you have steri-strips over your incision they will start to peel off in 7-10 days as you get them wet. They dont need to be removed prior to that. When they begin to peel off, you may remove them. They should all be removed by 14 days from your surgery. Showering/Bathing:    [x]   You may shower 2 days after your surgery. Your dressing may be removed for showering. You may get your incisions wet in the shower. Dont vigorously scrub your incisions. Apply a clean, dry dressing after you have dried your incisions. Do not take a bath or get into a swimming pool or Jacuzzi until the incisions are completely healed. This may take about 14 days. Do not soak your incision under water. Sling:    [x]   You are not required to wear your sling and should do so only as needed for comfort.  You have no restrictions with regards to the movement of your shoulder. Please push to achieve full range of motion as soon as possible. You may resume your normal daily activities immediately and return to work as soon as you feel appropriate.    []   Keep your arm in the immobilizer at all times except when showering and changing your clothes. When showering or changing, keep your arm at your side. Dont move it away from your body. []   Keep your arm in the immobilizer at all times except when showering, changing your clothes and doing the exercises shown to you by Dr. Lilia Ayers or your physical therapist prior to your discharge from the hospital.  Keep your arm at your side when changing your clothes and showering. Dont move it away from your body. Ice/Elevation    Continue ice consistently for 48 hours after surgery. After 48 hours, you should ice your shoulder 3 times per day, for 20 minutes at a time for the next 5 days. After one week from surgery, you may use ice as needed for pain and swelling. Diet:    You may advance to your regular diet as tolerated. Medication:    1. You will be given a prescription for pain medication when you are discharged from the hospital.  Take the medication as needed according to the directions on the prescription bottle. Possible side effects of the medication include dizziness, headache, nausea, vomiting, constipation and urinary retention. If you experience any of these side effects call the office so that we can assist you in relieving them. Discontinue the use of the pain medication if you develop itching, rash, shortness of breath or difficulties swallowing. If these symptoms become severe or arent relieved by discontinuing the medication you should seek immediate medical attention. Refills of pain medication are authorized during office hours only (8 AM-5PM Mon. thru Fri.).    2. If you were prescribed Percoset/oxycodone or Dilaudid/hydromorphone you must have a written prescription. These medications legally cannot be called in to the pharmacy. 3. You may take over the counter Ibuprofen/Advil/Aleve between dosages of your pain medication if needed. Do not take Tylenol in addition to your pain medication as most of the pain medication already contains Tylenol. Do not exceed 3000mg of Tylenol per day. Ex: (hydrocodone 5/325g= 325mg of Tylenol)  4. You may resume the medication you were taking prior to surgery. Pain medication may change the effects of any antidepressant medication you are taking. If you have any questions about possible interactions between your regular medications and the pain medication you should consult the physician who prescribes your regular medications. Follow Up Appointment:  If you are unsure of your follow-up appointment date and time, please call [phone]. Please let our  know you are scheduling a post-op appointment. Most appointments should be between 7-14 days after your surgery. Physical Therapy:    []    If you already have a therapy appointment, please be sure to attend your sessions as scheduled. []   Physical Therapy will be discussed with you at your first follow-up appointment with Dr. Yahaira Bocanegra. You dont need to begin physical therapy prior to that.    []  Begin physical therapy with your Home Health Physical Therapy. This will be set up         for your before you leave the hospital.    [x]  You do not require Physical Therapy. Important Signs and Symptoms:    If any of the following signs and symptoms occurs, you should contact Dr. Yahaira Bocanegra office. Please be advised if a problem arises which you feel requires immediate medical attention or you are unable to contact Dr. Yahaira Bocanegra office you should seek immediate medical attention. Signs and symptoms to watch for include:    1.  A sudden increase in swelling and/or redness or warmth at the area your surgery was performed which isnt relieved by rest, ice, and elevation. 2. Oral temperature greater than 101 degrees for 12 hours or more which isnt relieved by an increase in fluid intake and taking two Tylenol every 4-6 hours. 3. Excessive drainage from your incisions, or drainage which hasnt stopped by 72 hours after your surgery. 4. Fever, chills, shortness of breath, chest pain, nausea, vomiting or other signs and symptoms which are of concern to you. DISCHARGE SUMMARY from Nurse    PATIENT INSTRUCTIONS:    After general anesthesia or intravenous sedation, for 24 hours or while taking prescription Narcotics:  · Limit your activities  · Do not drive and operate hazardous machinery  · Do not make important personal or business decisions  · Do  not drink alcoholic beverages  · If you have not urinated within 8 hours after discharge, please contact your surgeon on call. Report the following to your surgeon:  · Excessive pain, swelling, redness or odor of or around the surgical area  · Temperature over 100.5  · Nausea and vomiting lasting longer than 4 hours or if unable to take medications  · Any signs of decreased circulation or nerve impairment to extremity: change in color, persistent  numbness, tingling, coldness or increase pain  · Any questions    What to do at Home:  Recommended activity: Activity as tolerated and no driving for today,     If you experience any of the following symptoms as above, please follow up with Dr Jasper Colin. *  Please give a list of your current medications to your Primary Care Provider. *  Please update this list whenever your medications are discontinued, doses are      changed, or new medications (including over-the-counter products) are added. *  Please carry medication information at all times in case of emergency situations.     These are general instructions for a healthy lifestyle:    No smoking/ No tobacco products/ Avoid exposure to second hand smoke  Surgeon General's Warning:  Quitting smoking now greatly reduces serious risk to your health. Obesity, smoking, and sedentary lifestyle greatly increases your risk for illness    A healthy diet, regular physical exercise & weight monitoring are important for maintaining a healthy lifestyle    You may be retaining fluid if you have a history of heart failure or if you experience any of the following symptoms:  Weight gain of 3 pounds or more overnight or 5 pounds in a week, increased swelling in our hands or feet or shortness of breath while lying flat in bed. Please call your doctor as soon as you notice any of these symptoms; do not wait until your next office visit. Recognize signs and symptoms of STROKE:    F-face looks uneven    A-arms unable to move or move unevenly    S-speech slurred or non-existent    T-time-call 911 as soon as signs and symptoms begin-DO NOT go       Back to bed or wait to see if you get better-TIME IS BRAIN. Warning Signs of HEART ATTACK     Call 911 if you have these symptoms:   Chest discomfort. Most heart attacks involve discomfort in the center of the chest that lasts more than a few minutes, or that goes away and comes back. It can feel like uncomfortable pressure, squeezing, fullness, or pain.  Discomfort in other areas of the upper body. Symptoms can include pain or discomfort in one or both arms, the back, neck, jaw, or stomach.  Shortness of breath with or without chest discomfort.  Other signs may include breaking out in a cold sweat, nausea, or lightheadedness. Don't wait more than five minutes to call 911 - MINUTES MATTER! Fast action can save your life. Calling 911 is almost always the fastest way to get lifesaving treatment. Emergency Medical Services staff can begin treatment when they arrive -- up to an hour sooner than if someone gets to the hospital by car. Patient armband removed and shredded  The discharge information has been reviewed with the patient and caregiver.   The patient and caregiver verbalized understanding. Discharge medications reviewed with the patient and caregiver and appropriate educational materials and side effects teaching were provided.   ___________________________________________________________________________________________________________________________________

## 2018-07-05 ENCOUNTER — HOSPITAL ENCOUNTER (OUTPATIENT)
Dept: MRI IMAGING | Age: 43
Discharge: HOME | End: 2018-07-05
Attending: PHYSICIAN ASSISTANT
Payer: MEDICARE

## 2018-07-05 DIAGNOSIS — M25.572 LEFT ANKLE PAIN: ICD-10-CM

## 2018-07-05 DIAGNOSIS — M25.472 LEFT ANKLE SWELLING: ICD-10-CM

## 2018-07-05 DIAGNOSIS — M25.579 PAIN IN UNSPECIFIED ANKLE AND JOINTS OF UNSPECIFIED FOOT: ICD-10-CM

## 2018-07-05 DIAGNOSIS — M25.372 LEFT ANKLE INSTABILITY: ICD-10-CM

## 2018-07-05 DIAGNOSIS — G89.29 OTHER CHRONIC PAIN: ICD-10-CM

## 2018-07-05 PROCEDURE — 73721 MRI JNT OF LWR EXTRE W/O DYE: CPT

## 2018-08-31 ENCOUNTER — DOCUMENTATION ONLY (OUTPATIENT)
Dept: SURGERY | Age: 43
End: 2018-08-31

## 2018-08-31 ENCOUNTER — OFFICE VISIT (OUTPATIENT)
Dept: SURGERY | Age: 43
End: 2018-08-31

## 2018-08-31 VITALS
DIASTOLIC BLOOD PRESSURE: 82 MMHG | OXYGEN SATURATION: 100 % | SYSTOLIC BLOOD PRESSURE: 130 MMHG | HEART RATE: 66 BPM | HEIGHT: 65 IN | BODY MASS INDEX: 39.92 KG/M2 | RESPIRATION RATE: 16 BRPM | TEMPERATURE: 97.7 F | WEIGHT: 239.6 LBS

## 2018-08-31 DIAGNOSIS — G47.30 SLEEP APNEA, UNSPECIFIED TYPE: ICD-10-CM

## 2018-08-31 DIAGNOSIS — Z79.4 TYPE 2 DIABETES MELLITUS WITHOUT COMPLICATION, WITH LONG-TERM CURRENT USE OF INSULIN (HCC): ICD-10-CM

## 2018-08-31 DIAGNOSIS — Z98.84 STATUS POST GASTRIC BYPASS FOR OBESITY: ICD-10-CM

## 2018-08-31 DIAGNOSIS — Z87.891 SMOKING HISTORY: ICD-10-CM

## 2018-08-31 DIAGNOSIS — E66.01 SEVERE OBESITY WITH BODY MASS INDEX (BMI) OF 35.0 TO 39.9 WITH COMORBIDITY (HCC): Primary | ICD-10-CM

## 2018-08-31 DIAGNOSIS — K21.9 GASTROESOPHAGEAL REFLUX DISEASE WITHOUT ESOPHAGITIS: ICD-10-CM

## 2018-08-31 DIAGNOSIS — D64.9 ANEMIA, UNSPECIFIED TYPE: ICD-10-CM

## 2018-08-31 DIAGNOSIS — I50.9 HEART FAILURE, UNSPECIFIED HF CHRONICITY, UNSPECIFIED HEART FAILURE TYPE (HCC): ICD-10-CM

## 2018-08-31 DIAGNOSIS — K90.9 INTESTINAL MALABSORPTION, UNSPECIFIED TYPE: ICD-10-CM

## 2018-08-31 DIAGNOSIS — E11.9 TYPE 2 DIABETES MELLITUS WITHOUT COMPLICATION, WITH LONG-TERM CURRENT USE OF INSULIN (HCC): ICD-10-CM

## 2018-08-31 RX ORDER — LOSARTAN POTASSIUM AND HYDROCHLOROTHIAZIDE 25; 100 MG/1; MG/1
1 TABLET ORAL DAILY
COMMUNITY
End: 2020-05-08

## 2018-08-31 NOTE — PATIENT INSTRUCTIONS
Body Mass Index: Care Instructions  Your Care Instructions    Body mass index (BMI) can help you see if your weight is raising your risk for health problems. It uses a formula to compare how much you weigh with how tall you are. · A BMI lower than 18.5 is considered underweight. · A BMI between 18.5 and 24.9 is considered healthy. · A BMI between 25 and 29.9 is considered overweight. A BMI of 30 or higher is considered obese. If your BMI is in the normal range, it means that you have a lower risk for weight-related health problems. If your BMI is in the overweight or obese range, you may be at increased risk for weight-related health problems, such as high blood pressure, heart disease, stroke, arthritis or joint pain, and diabetes. If your BMI is in the underweight range, you may be at increased risk for health problems such as fatigue, lower protection (immunity) against illness, muscle loss, bone loss, hair loss, and hormone problems. BMI is just one measure of your risk for weight-related health problems. You may be at higher risk for health problems if you are not active, you eat an unhealthy diet, or you drink too much alcohol or use tobacco products. Follow-up care is a key part of your treatment and safety. Be sure to make and go to all appointments, and call your doctor if you are having problems. It's also a good idea to know your test results and keep a list of the medicines you take. How can you care for yourself at home? · Practice healthy eating habits. This includes eating plenty of fruits, vegetables, whole grains, lean protein, and low-fat dairy. · If your doctor recommends it, get more exercise. Walking is a good choice. Bit by bit, increase the amount you walk every day. Try for at least 30 minutes on most days of the week. · Do not smoke. Smoking can increase your risk for health problems. If you need help quitting, talk to your doctor about stop-smoking programs and medicines. These can increase your chances of quitting for good. · Limit alcohol to 2 drinks a day for men and 1 drink a day for women. Too much alcohol can cause health problems. If you have a BMI higher than 25  · Your doctor may do other tests to check your risk for weight-related health problems. This may include measuring the distance around your waist. A waist measurement of more than 40 inches in men or 35 inches in women can increase the risk of weight-related health problems. · Talk with your doctor about steps you can take to stay healthy or improve your health. You may need to make lifestyle changes to lose weight and stay healthy, such as changing your diet and getting regular exercise. If you have a BMI lower than 18.5  · Your doctor may do other tests to check your risk for health problems. · Talk with your doctor about steps you can take to stay healthy or improve your health. You may need to make lifestyle changes to gain or maintain weight and stay healthy, such as getting more healthy foods in your diet and doing exercises to build muscle. Where can you learn more? Go to http://ayana-johanna.info/. Enter S176 in the search box to learn more about \"Body Mass Index: Care Instructions. \"  Current as of: October 9, 2017  Content Version: 11.7  © 4636-6221 [employer]. Care instructions adapted under license by [employer] (which disclaims liability or warranty for this information). If you have questions about a medical condition or this instruction, always ask your healthcare professional. [address] any warranty or liability for your use of this information.

## 2018-08-31 NOTE — PROGRESS NOTES
Revision Surgery Consultation    Subjective: The patient is a 39 y.o. obese female with a Body mass index is 39.87 kg/(m^2). [address] The patient had a laparoscopic gastric bypass procedure done approximatly 5 years ago in [address] [place] [address].  her starting weight prior to surgery was 245 lbs. she ultimately lost approximately 40 lbs with a subsequent weight regain of 34 lbs. her peak EBWL at 2 years out from surgery was 36% and now her current EBWL is 5%. her last bariatric follow-up was years ago with her surgeon. Leonel Quezada notes that she had no issues in the immediate post-op phase and had no hospital readmissions in the remote post-op phase. she currently is having the following issues related to his health: dysphagias requiring two local EGDs with dilation (according to patient / no records available at note time). she is here today to discuss a possible revision of her gastric bypass because of dysphagia and weight regain. All of their prior evaluations available by both their PCP's and specialists physicians have been reviewed today either in the Care Everywhere portal or scanned under the media tab. I have spent a large portion of my initial consultation today reviewing the patients current dietary habits which have contributed to their health issues, weight regain and  their current obesity. They understand that generally speaking,  weight regain is  a function of resuming less that ideal dietary habits instead of being a procedural issue. They understand that older procedures are more likely to be associated with a less that perfect procedural result, such as a prior vertical banded gastroplasty or non divided gastric bypass. These procedures are more likely to result in staple line failures with resultant weight regain. This has been explained to the patient via diagrams of these older procedures and given to the patient.     I have suggested to them personally a dietary regimen that they can initiate now to help with their status as it pertains to their weight. They understand that the most important aspect of their journey through their weight loss endeavor will be their adherence to a new lifestyle of healthy eating behavior. They also understand that an adherence to an exercise program will not only help with weight loss but is ultimately important in weight maintenance.       Patient Active Problem List    Diagnosis Date Noted    GERD (gastroesophageal reflux disease)     Diabetes ([facility]     Arthritis     Severe obesity with body mass index (BMI) of 35.0 to 39.9 with comorbidity ([facility]     Status post gastric bypass for obesity     Intestinal malabsorption     Hypercholesterolemia     Sleep apnea     Migraine     Anemia     Heavy menses     Sickle cell trait (HCC)     Smoking history     CTS (carpal tunnel syndrome) 05/17/2018    High cholesterol 05/16/2018    Peripheral neuropathy 05/16/2018    Asthma 05/16/2018    Anxiety 05/16/2018    Acid reflux 05/16/2018    Depression 05/16/2018    HTN (hypertension) 05/16/2018    Carpal tunnel syndrome of right wrist 05/16/2018    Cubital tunnel syndrome on right 05/16/2018    Hypertension 01/01/2015    Heart failure ([facility] 01/01/2015      Past Surgical History:   Procedure Laterality Date    BIOPSY THYROID      benign results    EGD      x 2 for post gastric bypass issues / Dr. Keyonna Hernandez ([facility])    HX APPENDECTOMY  2006    HX CARPAL TUNNEL RELEASE Right     HX DILATION AND CURETTAGE  2001    HX GASTRIC BYPASS      [place] / Fall of 2013    HX HERNIA REPAIR      incisional hernia related to gastric bypass incision      Social History   Substance Use Topics    Smoking status: Former Smoker     Types: Cigarettes     Quit date: 5/31/2018    Smokeless tobacco: Never Used    Alcohol use No      Family History   Problem Relation Age of Onset    Sickle Cell Anemia Mother     GERD Mother       Current Outpatient Prescriptions Medication Sig Dispense Refill    losartan-hydroCHLOROthiazide (HYZAAR) 100-25 mg per tablet Take 1 Tab by mouth daily.  cyclobenzaprine (FLEXERIL) 10 mg tablet Take 10 mg by mouth nightly.  cyanocobalamin (VITAMIN B-12) 1,000 mcg sublingual tablet Take 1,000 mcg by mouth daily.  OTHER Iron injection weekly      FLUoxetine (PROZAC) 20 mg capsule Take 20 mg by mouth daily.  nortriptyline (PAMELOR) 50 mg capsule Take 100 mg by mouth two (2) times a day.  atorvastatin (LIPITOR) 40 mg tablet Take 40 mg by mouth daily.  montelukast (SINGULAIR) 10 mg tablet Take 10 mg by mouth daily.  albuterol (PROAIR HFA) 90 mcg/actuation inhaler Take 2 Puffs by inhalation every four (4) hours as needed for Wheezing.  budesonide-formoterol (SYMBICORT) 160-4.5 mcg/actuation HFAA Take 2 Puffs by inhalation two (2) times a day.  insulin lispro (HUMALOG U-100 INSULIN) 100 unit/mL injection by SubCUTAneous route Before breakfast, lunch, and dinner. Sliding scale      insulin glargine (LANTUS SOLOSTAR U-100 INSULIN) 100 unit/mL (3 mL) inpn 42 Units by SubCUTAneous route nightly.  metFORMIN (GLUCOPHAGE) 1,000 mg tablet Take 1,000 mg by mouth two (2) times daily (with meals).  isosorbide mononitrate ER (IMDUR) 30 mg tablet Take 30 mg by mouth daily.  nitroglycerin (NITROSTAT) 0.4 mg SL tablet 0.4 mg by SubLINGual route every five (5) minutes as needed for Chest Pain. Up to 3 doses.  aspirin delayed-release 81 mg tablet Take 81 mg by mouth daily.  lisinopril (PRINIVIL, ZESTRIL) 40 mg tablet Take 40 mg by mouth daily.  amLODIPine (NORVASC) 10 mg tablet Take 10 mg by mouth daily.        Allergies   Allergen Reactions    Penicillins Anaphylaxis    Tomato Hives          Review of Systems:            General - No history or complaints of unexpected fever, chills, or weight loss  Head/Neck - No history or complaints of headache, diplopia, dysphagia, hearing loss  Cardiac - No history or complaints of chest pain, palpitations, murmur, or shortness of breath  Pulmonary - No history or complaints of shortness of breath, productive cough, hemoptysis  Gastrointestinal - No history or complaints of reflux,  abdominal pain, obstipation/constipation, blood per rectum  Genitourinary - No history or complaints of hematuria/dysuria, stress urinary incontinence symptoms, or renal lithiasis  Musculoskeletal - No history or complaints of joint pain or muscular weakness  Hematologic - No history or complaints of bleeding disorders, blood transfusions, sickle cell anemia  Neurologic - No history or complaints of  migraine headaches, seizure activity, syncopal episodes, TIA or stroke  Integumentary - No history or complaints of rashes, abnormal nevi, skin cancer  Gynecological - No history of heavy menses/abnormal menses           Objective:     Visit Vitals    /82 (BP 1 Location: Left arm, BP Patient Position: Sitting)    Pulse 66    Temp 97.7 °F (36.5 °C)    Resp 16    Ht 5' 5\" (1.651 m)    Wt 108.7 kg (239 lb 9.6 oz)    SpO2 100%    BMI 39.87 kg/m2       Physical Examination: General appearance - alert, well appearing, and in no distress  Mental status - alert, oriented to person, place, and time  Eyes - pupils equal and reactive, extraocular eye movements intact  Ears - bilateral TM's and external ear canals normal  Nose - normal and patent, no erythema, discharge or polyps  Mouth - mucous membranes moist, pharynx normal without lesions  Neck - supple, no significant adenopathy  Lymphatics - no palpable lymphadenopathy, no hepatosplenomegaly  Chest - clear to auscultation, no wheezes, rales or rhonchi, symmetric air entry  Heart - normal rate, regular rhythm, normal S1, S2, no murmurs, rubs, clicks or gallops  Abdomen - soft, nontender, nondistended, no masses or organomegaly  Back exam - full range of motion, no tenderness, palpable spasm or pain on motion  Neurological - alert, oriented, normal speech, no focal findings or movement disorder noted  Musculoskeletal - no joint tenderness, deformity or swelling  Extremities - peripheral pulses normal, no pedal edema, no clubbing or cyanosis  Skin - normal coloration and turgor, no rashes, no suspicious skin lesions noted    Labs / Old Records:     Lab Results   Component Value Date/Time    WBC 9.3 05/14/2018 10:08 AM    HGB 10.4 (L) 05/14/2018 10:08 AM    HCT 33.4 (L) 05/14/2018 10:08 AM    PLATELET 428 86/03/5462 10:08 AM    MCV 75.9 05/14/2018 10:08 AM     Lab Results   Component Value Date/Time    Sodium 140 05/14/2018 10:08 AM    Potassium 3.7 05/14/2018 10:08 AM    Chloride 106 05/14/2018 10:08 AM    CO2 26 05/14/2018 10:08 AM    Anion gap 8 05/14/2018 10:08 AM    Glucose 169 (H) 05/14/2018 10:08 AM    BUN 14 05/14/2018 10:08 AM    Creatinine 0.85 05/14/2018 10:08 AM    BUN/Creatinine ratio 16 05/14/2018 10:08 AM    GFR est AA >60 05/14/2018 10:08 AM    GFR est non-AA >60 05/14/2018 10:08 AM    Calcium 8.2 (L) 05/14/2018 10:08 AM    Bilirubin, total 0.5 05/14/2018 10:08 AM    AST (SGOT) 11 (L) 05/14/2018 10:08 AM    Alk. phosphatase 103 05/14/2018 10:08 AM    Protein, total 7.5 05/14/2018 10:08 AM    Albumin 3.3 (L) 05/14/2018 10:08 AM    Globulin 4.2 (H) 05/14/2018 10:08 AM    A-G Ratio 0.8 05/14/2018 10:08 AM    ALT (SGPT) 10 (L) 05/14/2018 10:08 AM     No results found for: IRON, FE, TIBC, IBCT, PSAT, FERR  No results found for: FOL, RBCF  No results found for: VITD3, XQVID2, XQVID3, XQVID, VD3RIA          Old operative reports reviewed if available and are scanned under the media tab or reviewed under Care Everywhere        Assessment:     Morbid obesity status post laparoscopic gastric bypass procedure approximately 5 years ago with complaint of weight regain and dysphagia    Last EGD was in 2017 (records requested from Dr. Ander Jimenez office). She now sees Dr. Ely Heck because she had a disagreement with [facility].     Patient is somewhat of a poor historian regarding her surgical and EGD history    Labs via [facility] reviewed today -     Plan: 1. Weight regain-Today in our office I had a lengthy discussion with Ursula Mansfield regarding the nature of their prior procedure. We discussed the anatomical changes to their anatomy and how this relates to  contributing weight regain. Our office will continue to attempt to obtain any medical records related to their procedure if we were not able to obtain theme today. It was also discussed today that before any decisions can be made regarding a possible revision of their initial  procedure that an upper GI swallow study must be obtained to evaluate their post surgical anatomy. They understand that the majority of bariatric patients are not revision candidates due to appropriate post surgical anatomy that can not be improved upon. They understand also that if their initial procedure was performed via an open technique that this alone complicates their situation immensely. They understand, as I have explained today, that the adhesive disease associated with prior open procedures is at times a rate limiting factor. This precludes our ability to perform a revision procedure safely. The factors that contribute to this are increased risk such as age, health issues and increased risk from a procedural standpoint and have been discussed today. We will proceed with the UGI swallow study as described above. The patient understands all of the above and wishes to proceed with the study. 2.Nutrition-  I have discussed in detail the pitfalls in diet that have contributed to their weight regain and the importance of adhering to a lifelong regimen of dietary goals and proper eating habits. I have discussed the proper lifelong bariatric diet  in detail spending in excess of 20 minutes discussing this.  We will schedule them for a dietary consultation with our nutritionist and urge them to continue on a regular follow-up schedule with her. 3.Maintenance vitamins- Today we have discussed the importance of vitamins as it pertains to their procedure and we will obtain appropriate lab to check all levels. They have been provided a handout regarding this today. Total time spent with the patient reviewing their complex history of bariatric surgery,diet, and plan is in excess of 60 minutes.       Secondary Diagnoses:         Signed By: Brittanie Almeida MD     August 31, 2018

## 2018-08-31 NOTE — PROGRESS NOTES
Pt was seen earlier today in the office for a possible revision of her gastric bypass performed in [place] 5 years ago    Pt noted an EGD via Dr. Ani Gale in 2017    Reports obtained from Carol's office after pt left    EGD from 5/30/2017 -     - dilation of distal esophageal stricture to 20mm  - no evidence of Ocasio's  - small hiatal hernia     No mention of any issues with her gastric bypass anatomy    Report scanned into media tab

## 2018-08-31 NOTE — MR AVS SNAPSHOT
[address] [facility] [address] 
 
 
 [address] [facility] [address] [facility] 
[phone] Patient: To Bowman MRN: [id number] Visit Information Date & Time Provider Department Dept. Phone Encounter #  
 8/31/2018  8:00 AM Sofya Pearson MD [facility] [phone] [id number] Follow-up Instructions Follow-up and Disposition History Your Appointments 9/20/2018  8:00 AM  
NUTRITION COUNSELING with TSS NUTR VISIT2 [employer] [facility] ([facility]) Appt Note: f/u  
 One [facility] [address] [facility] [address]  
  
   
 [address] [place] [address] Upcoming Health Maintenance Date Due  
 LIPID PANEL Q1 1974 FOOT EXAM Q1 2/23/1984 MICROALBUMIN Q1 2/23/1984 EYE EXAM RETINAL OR DILATED Q1 2/23/1984 Pneumococcal 19-64 Medium Risk (1 of 1 - PPSV23) 2/23/1993 DTaP/Tdap/Td series (1 - Tdap) 2/23/1995 PAP AKA CERVICAL CYTOLOGY 2/23/1995 Influenza Age 5 to Adult 8/1/2018 MEDICARE YEARLY EXAM 8/31/2018 HEMOGLOBIN A1C Q6M 11/14/2018 Allergies as of 8/31/2018  Review Complete On: 8/31/2018 By: Sofya Pearson MD  
  
 Severity Noted Reaction Type Reactions Penicillins High 05/16/2018    Anaphylaxis Tomato  05/16/2018    Hives Current Immunizations  Never Reviewed No immunizations on file. Not reviewed this visit You Were Diagnosed With   
  
 Codes Comments Severe obesity with body mass index (BMI) of 35.0 to 39.9 with comorbidity ([facility]    -  Primary ICD-10-CM: E66.01 
ICD-9-CM: 278.01 Heart failure, unspecified HF chronicity, unspecified heart failure type ([facility]     ICD-10-CM: I50.9 ICD-9-CM: 950. 9 Type 2 diabetes mellitus without complication, with long-term current use of insulin (HCC)     ICD-10-CM: E11.9, Z79.4 ICD-9-CM: 250.00, V58.67   
 Status post gastric bypass for obesity     ICD-10-CM: Z98.84 ICD-9-CM: V45.86 Sleep apnea, unspecified type     ICD-10-CM: G47.30 ICD-9-CM: 780.57 Anemia, unspecified type     ICD-10-CM: D64.9 ICD-9-CM: 285.9 Smoking history     ICD-10-CM: Z87.891 ICD-9-CM: V15.82 Gastroesophageal reflux disease without esophagitis     ICD-10-CM: K21.9 ICD-9-CM: 530.81 Intestinal malabsorption, unspecified type     ICD-10-CM: K90.9 ICD-9-CM: 579.9 Vitals BP Pulse Temp Resp Height(growth percentile) Weight(growth percentile) 130/82 (BP 1 Location: Left arm, BP Patient Position: Sitting) 66 97.7 °F (36.5 °C) 16 5' 5\" (1.651 m) 239 lb 9.6 oz (108.7 kg) SpO2 BMI OB Status Smoking Status 100% 39.87 kg/m2 Having regular periods Former Smoker Vitals History BMI and BSA Data Body Mass Index Body Surface Area  
 39.87 kg/m 2 2.23 m 2 Preferred Pharmacy Pharmacy Name Phone [address] [place] [phone] Your Updated Medication List  
  
   
This list is accurate as of 8/31/18  9:23 AM.  Always use your most recent med list.  
  
  
  
  
 aspirin delayed-release 81 mg tablet Take 81 mg by mouth daily. atorvastatin 40 mg tablet Commonly known as:  LIPITOR Take 40 mg by mouth daily. cyclobenzaprine 10 mg tablet Commonly known as:  FLEXERIL Take 10 mg by mouth nightly. FLUoxetine 20 mg capsule Commonly known as:  PROzac Take 20 mg by mouth daily. HumaLOG U-100 Insulin 100 unit/mL injection Generic drug:  insulin lispro  
by SubCUTAneous route Before breakfast, lunch, and dinner. Sliding scale  
  
 isosorbide mononitrate ER 30 mg tablet Commonly known as:  IMDUR Take 30 mg by mouth daily. LANTUS SOLOSTAR U-100 INSULIN 100 unit/mL (3 mL) Inpn Generic drug:  insulin glargine 42 Units by SubCUTAneous route nightly. lisinopril 40 mg tablet Commonly known as:  [place] November Take 40 mg by mouth daily. losartan-hydroCHLOROthiazide 100-25 mg per tablet Commonly known as:  HYZAAR Take 1 Tab by mouth daily. metFORMIN 1,000 mg tablet Commonly known as:  GLUCOPHAGE Take 1,000 mg by mouth two (2) times daily (with meals). nitroglycerin 0.4 mg SL tablet Commonly known as:  NITROSTAT  
0.4 mg by SubLINGual route every five (5) minutes as needed for Chest Pain. Up to 3 doses. nortriptyline 50 mg capsule Commonly known as:  PAMELOR Take 100 mg by mouth two (2) times a day. NORVASC 10 mg tablet Generic drug:  amLODIPine Take 10 mg by mouth daily. OTHER Iron injection weekly PROAIR HFA 90 mcg/actuation inhaler Generic drug:  albuterol Take 2 Puffs by inhalation every four (4) hours as needed for Wheezing. SINGULAIR 10 mg tablet Generic drug:  montelukast  
Take 10 mg by mouth daily. SYMBICORT 160-4.5 mcg/actuation Hfaa Generic drug:  budesonide-formoterol Take 2 Puffs by inhalation two (2) times a day. VITAMIN B-12 1,000 mcg sublingual tablet Generic drug:  cyanocobalamin Take 1,000 mcg by mouth daily. To-Do List   
 08/31/2018 Lab:  TSH 3RD GENERATION   
  
 08/31/2018 Lab:  VITAMIN D, 25 HYDROXY   
  
 10/30/2018 Lab:  CBC WITH AUTOMATED DIFF   
  
 10/30/2018 Lab:  FERRITIN   
  
 10/30/2018 Lab:  IRON   
  
 10/30/2018 Lab:  METABOLIC PANEL, COMPREHENSIVE   
  
 10/30/2018 Lab:  VITAMIN B1, WHOLE BLOOD   
  
 10/30/2018 Lab:  VITAMIN B12 & FOLATE Patient Instructions Body Mass Index: Care Instructions Your Care Instructions Body mass index (BMI) can help you see if your weight is raising your risk for health problems. It uses a formula to compare how much you weigh with how tall you are. · A BMI lower than 18.5 is considered underweight. · A BMI between 18.5 and 24.9 is considered healthy. · A BMI between 25 and 29.9 is considered overweight. A BMI of 30 or higher is considered obese. If your BMI is in the normal range, it means that you have a lower risk for weight-related health problems. If your BMI is in the overweight or obese range, you may be at increased risk for weight-related health problems, such as high blood pressure, heart disease, stroke, arthritis or joint pain, and diabetes. If your BMI is in the underweight range, you may be at increased risk for health problems such as fatigue, lower protection (immunity) against illness, muscle loss, bone loss, hair loss, and hormone problems. BMI is just one measure of your risk for weight-related health problems. You may be at higher risk for health problems if you are not active, you eat an unhealthy diet, or you drink too much alcohol or use tobacco products. Follow-up care is a key part of your treatment and safety. Be sure to make and go to all appointments, and call your doctor if you are having problems. It's also a good idea to know your test results and keep a list of the medicines you take. How can you care for yourself at home? · Practice healthy eating habits. This includes eating plenty of fruits, vegetables, whole grains, lean protein, and low-fat dairy. · If your doctor recommends it, get more exercise. Walking is a good choice. Bit by bit, increase the amount you walk every day. Try for at least 30 minutes on most days of the week. · Do not smoke. Smoking can increase your risk for health problems. If you need help quitting, talk to your doctor about stop-smoking programs and medicines. These can increase your chances of quitting for good. · Limit alcohol to 2 drinks a day for men and 1 drink a day for women. Too much alcohol can cause health problems. If you have a BMI higher than 25 · Your doctor may do other tests to check your risk for weight-related health problems.  This may include measuring the distance around your waist. A waist measurement of more than 40 inches in men or 35 inches in women can increase the risk of weight-related health problems. · Talk with your doctor about steps you can take to stay healthy or improve your health. You may need to make lifestyle changes to lose weight and stay healthy, such as changing your diet and getting regular exercise. If you have a BMI lower than 18.5 · Your doctor may do other tests to check your risk for health problems. · Talk with your doctor about steps you can take to stay healthy or improve your health. You may need to make lifestyle changes to gain or maintain weight and stay healthy, such as getting more healthy foods in your diet and doing exercises to build muscle. Where can you learn more? Go to http://ayana-johanna.info/. Enter S176 in the search box to learn more about \"Body Mass Index: Care Instructions. \" Current as of: October 9, 2017 Content Version: 11.7 © 6191-3140 [employer]. Care instructions adapted under license by [employer] (which disclaims liability or warranty for this information). If you have questions about a medical condition or this instruction, always ask your healthcare professional. [place] [address] any warranty or liability for your use of this information. Introducing [facility]! Dear Kena Alarcon: Thank you for requesting a [employer] account. Our records indicate that you already have an active [employer] account. You can access your account anytime at [email or web address]. [employer] Did you know that you can access your hospital and ER discharge instructions at any time in [employer]? You can also review all of your test results from your hospital stay or ER visit. Additional Information If you have questions, please visit the Frequently Asked Questions section of the [employer] website at [email or web address] [email or web address]/. Remember, Mobile [employer] is NOT to be used for urgent needs. For medical emergencies, dial 911. Now available from your iPhone and Android! Please provide this summary of care documentation to your next provider. Your primary care clinician is listed as Azalea Griffith. If you have any questions after today's visit, please call [phone].

## 2018-09-12 ENCOUNTER — HOSPITAL ENCOUNTER (OUTPATIENT)
Age: 43
Setting detail: OUTPATIENT SURGERY
Discharge: HOME | End: 2018-09-12
Attending: SPECIALIST | Admitting: SPECIALIST
Payer: MEDICARE

## 2018-09-12 ENCOUNTER — APPOINTMENT (OUTPATIENT)
Dept: GENERAL RADIOLOGY | Age: 43
End: 2018-09-12
Attending: SPECIALIST
Payer: MEDICARE

## 2018-09-12 VITALS
DIASTOLIC BLOOD PRESSURE: 76 MMHG | OXYGEN SATURATION: 98 % | HEIGHT: 65 IN | SYSTOLIC BLOOD PRESSURE: 148 MMHG | TEMPERATURE: 98.2 F | HEART RATE: 72 BPM | BODY MASS INDEX: 39.4 KG/M2 | WEIGHT: 236.5 LBS | RESPIRATION RATE: 16 BRPM

## 2018-09-12 DIAGNOSIS — E66.01 MORBID OBESITY (HCC): ICD-10-CM

## 2018-09-12 PROCEDURE — 77030032490 HC SLV COMPR SCD KNE COVD -B: Performed by: SPECIALIST

## 2018-09-12 PROCEDURE — 74240 X-RAY XM UPR GI TRC 1CNTRST: CPT

## 2018-09-12 PROCEDURE — 76040000019: Performed by: SPECIALIST

## 2018-09-12 PROCEDURE — 74011000255 HC RX REV CODE- 255: Performed by: SPECIALIST

## 2018-09-12 NOTE — PROCEDURES
5 years post laparoscopic gastric bypass via [place]. Pt poor historian but states she's had EGDs and dilations because she was \"too tight\". Normal UGI today.   See dictation for possible future plans (awaiting reports from Dr. Darlene Pineda)

## 2018-09-13 NOTE — OP NOTES
[address] REPORT    Ashley Chamberlain  MR#: [id number]  : 1975  ACCOUNT #: [de-identified]   DATE OF SERVICE: 2018    PREOPERATIVE DIAGNOSIS:  Patient 5 years postoperative from gastric bypass procedure performed in [place] with minimal weight maintenance for possible revision. POSTOPERATIVE DIAGNOSIS:   Patient 5 years postoperative from gastric bypass procedure performed in [place] with minimal weight maintenance for possible revision. Candance Huger PROCEDURE PERFORMED:  Upper gastrointestinal study with barium. ESTIMATED BLOOD LOSS:  None. SPECIMENS REMOVED:  None. ANESTHESIA:  None. COMPLICATIONS:  None. IMPLANTS:  None. ASSISTANT:  None. SURGEON:  Cris Bearden MD    STATEMENT OF MEDICAL NECESSITY:  The patient is a 39-year-old female who about 5 years ago underwent gastric bypass procedure performed laparoscopically in [place]. She initially lost just a fair amount of weight, but then had weight regain to the point where she is almost back to her preoperative weight. She presented to me in consultation for 2 reasons. She does have a history of the weight gain, which we mentioned, but she has some dysphagia symptoms and a history of esophageal webs requiring dilation in the past.  She states is has been a year since her last endoscopy, and she still feels like she has difficulty eating. DESCRIPTION OF PROCEDURE:  The patient brought to the fluoro unit where she was given thin barium. On swallowing the barium, she was noted to have normal peristalsis of her esophagus. She had proximal and distal esophageal with tapering into and through the gastroesophageal junction. There were no abnormalities at the distal esophageal region. Contrast the filled a small gastric pouch which was vertically oriented. Contrast readily flowed into the Linda limb, which was nonobstructed also. We did carry out the barium for several feet.   Her entire Linda limb was nondilated and it was appropriately positioned.   At this juncture, we are going to work her up for biliary tract disease with an ultrasound of the abdomen and then likely a CCK HIDA scan if that is negative,  If all of that is negative, we may pursue some type of diagnostic laparoscopy at some point to rule out an internal hernia, but there is the ultimate bigger question of whether or not she might be a candidate for some type of band of her bypass procedure in the future, but I feel like we do need to workup her symptoms she is having right now first.      Braeden Chavez       AT / LN  D: 09/13/2018 14:15     T: 09/13/2018 15:33  JOB #: [id number]

## 2018-09-17 ENCOUNTER — DOCUMENTATION ONLY (OUTPATIENT)
Dept: SURGERY | Age: 43
End: 2018-09-17

## 2018-09-17 DIAGNOSIS — R10.84 GENERALIZED ABDOMINAL PAIN: Primary | ICD-10-CM

## 2018-09-17 NOTE — PROGRESS NOTES
Pt called from office phone at [phone] (message left)    Pt seen in our office 2+ weeks ago for F/U gastric bypass X 5 years ago in [place]    UGI was normal via our office last week    EGD via Dr. Ani Gale 5/30/2017 with esophageal stricture requiring dilation    Instructed pt to F/U with Dr. Ani Gale for possible repeat EGD and dilation    Will consider gallbladder US if issues don't subside.     Instructed pt to call with any questions or concerns

## 2020-03-18 ENCOUNTER — OFFICE VISIT (OUTPATIENT)
Dept: SURGERY | Age: 45
End: 2020-03-18

## 2020-03-18 VITALS — WEIGHT: 231 LBS | HEIGHT: 65 IN | BODY MASS INDEX: 38.49 KG/M2

## 2020-03-18 DIAGNOSIS — Z98.84 STATUS POST GASTRIC BYPASS FOR OBESITY: ICD-10-CM

## 2020-03-18 DIAGNOSIS — R10.84 GENERALIZED ABDOMINAL PAIN: ICD-10-CM

## 2020-03-18 DIAGNOSIS — K90.9 INTESTINAL MALABSORPTION, UNSPECIFIED TYPE: Primary | ICD-10-CM

## 2020-03-18 NOTE — PROGRESS NOTES
Surgery Consultation    History of Present Illness:    Eliazar Loyd is a prior gastric bypass   patient who underwent their weight loss surgical procedure procedure approximately 7 years: ago. They now present with a history of abdominal pain. Onset was 1 year ago. The pain is described as bloating discomfort. Starts in LLQ, without radiation. Symptoms have been worsening since onset. The patient also gives a history of vomiting about twice weekly. States she has had freddie conversations with out office about evaluating her gallbladder. Pt noted an EGD via Dr. Earnest Roca in 2017. States she has new GI doctor that did recent EGD, but not able to see records in Care Everywhere     EGD from 5/30/2017 -      - dilation of distal esophageal stricture to 20mm  - no evidence of Ocasio's  - small hiatal hernia     Has been receiving iron infusions and due to see Dr. Ashley Zamora later this week. This visit was done on the phone due to current COVID-19 outbreak in our area.     Past Medical History:   Diagnosis Date    Anemia     has required Fe infusions    Arthritis     Asthma     sporadic inhaler use    Depression     Diabetes ([address]     Dx 1999 / started insulin immediately    GERD (gastroesophageal reflux disease)     Heart failure ([address] 2015    sees Dr. Cruz Armando Heavy menses     Hypercholesterolemia     Hypertension 2015    Intestinal malabsorption     Migraine     Severe obesity with body mass index (BMI) of 35.0 to 39.9 with comorbidity ([address]     Sickle cell trait ([address]     Sleep apnea     uses c-pap    Smoking history     quit summer 2018    Status post gastric bypass for obesity     laparoscopic / PA / Fall of 2013     Past Surgical History:   Procedure Laterality Date    BIOPSY THYROID      benign results    EGD      x 2 for post gastric bypass issues / Dr. Manny Goodman [facility])    HX APPENDECTOMY  2006    HX CARPAL TUNNEL RELEASE Right     HX DILATION AND CURETTAGE  2001    HX GASTRIC BYPASS      PA / Fall of     HX HERNIA REPAIR      incisional hernia related to gastric bypass incision      Family History   Problem Relation Age of Onset    Sickle Cell Anemia Mother     GERD Mother      Social History     Socioeconomic History    Marital status:      Spouse name: Not on file    Number of children: Not on file    Years of education: Not on file    Highest education level: Not on file   Tobacco Use    Smoking status: Former Smoker     Types: Cigarettes     Last attempt to quit: 2018     Years since quittin.8    Smokeless tobacco: Never Used   Substance and Sexual Activity    Alcohol use: No    Drug use: No      Current Outpatient Medications   Medication Sig    losartan-hydroCHLOROthiazide (HYZAAR) 100-25 mg per tablet Take 1 Tab by mouth daily.  cyclobenzaprine (FLEXERIL) 10 mg tablet Take 10 mg by mouth nightly.  cyanocobalamin (VITAMIN B-12) 1,000 mcg sublingual tablet Take 1,000 mcg by mouth daily.  OTHER Iron injection weekly    FLUoxetine (PROZAC) 20 mg capsule Take 20 mg by mouth daily.  nortriptyline (PAMELOR) 50 mg capsule Take 100 mg by mouth two (2) times a day.  atorvastatin (LIPITOR) 40 mg tablet Take 40 mg by mouth daily.  montelukast (SINGULAIR) 10 mg tablet Take 10 mg by mouth daily.  albuterol (PROAIR HFA) 90 mcg/actuation inhaler Take 2 Puffs by inhalation every four (4) hours as needed for Wheezing.  budesonide-formoterol (SYMBICORT) 160-4.5 mcg/actuation HFAA Take 2 Puffs by inhalation two (2) times a day.  insulin lispro (HUMALOG U-100 INSULIN) 100 unit/mL injection by SubCUTAneous route Before breakfast, lunch, and dinner. Sliding scale    insulin glargine (LANTUS SOLOSTAR U-100 INSULIN) 100 unit/mL (3 mL) inpn 42 Units by SubCUTAneous route nightly.  metFORMIN (GLUCOPHAGE) 1,000 mg tablet Take 1,000 mg by mouth two (2) times daily (with meals).     isosorbide mononitrate ER (IMDUR) 30 mg tablet Take 30 mg by mouth daily.  nitroglycerin (NITROSTAT) 0.4 mg SL tablet 0.4 mg by SubLINGual route every five (5) minutes as needed for Chest Pain. Up to 3 doses.  aspirin delayed-release 81 mg tablet Take 81 mg by mouth daily.  lisinopril (PRINIVIL, ZESTRIL) 40 mg tablet Take 40 mg by mouth daily.  amLODIPine (NORVASC) 10 mg tablet Take 10 mg by mouth daily. No current facility-administered medications for this visit. Allergies   Allergen Reactions    Penicillins Anaphylaxis    Tomato Hives       Review of Symptoms:     General - No history or complaints of unexpected fever, chills, or weight loss  Head/Neck - No history or complaints of headache, diplopia, dysphagia, hearing loss  Cardiac - No history or complaints of chest pain, palpitations, murmur, or shortness of breath  Pulmonary - No history or complaints of shortness of breath, productive cough, hemoptysis  Gastrointestinal - As per the HPI above. Genitourinary - No history or complaints of hematuria/dysuria, stress urinary incontinence symptoms, or renal lithiasis  Musculoskeletal - No history or complaints of joint pain or muscular weakness  Hematologic - No history or complaints of bleeding disorders, blood transfusions, sickle cell anemia  Neurologic - No history or complaints of  migraine headaches, seizure activity, syncopal episodes, TIA or stroke  Integumentary - No history or complaints of rashes, abnormal nevi, skin cancer  Gynecological - No history of heavy menses/abnormal menses        Physical Exam:     Was unable to perform PE due to phone visit.            Laboratory / X-Rays:      Lab Results   Component Value Date/Time    WBC 9.3 05/14/2018 10:08 AM    HGB 10.4 (L) 05/14/2018 10:08 AM    HCT 33.4 (L) 05/14/2018 10:08 AM    PLATELET 559 59/95/6647 10:08 AM    MCV 75.9 05/14/2018 10:08 AM     Lab Results   Component Value Date/Time    Sodium 140 05/14/2018 10:08 AM    Potassium 3.7 05/14/2018 10:08 AM    Chloride 106 05/14/2018 10:08 AM    CO2 26 05/14/2018 10:08 AM    Anion gap 8 05/14/2018 10:08 AM    Glucose 169 (H) 05/14/2018 10:08 AM    BUN 14 05/14/2018 10:08 AM    Creatinine 0.85 05/14/2018 10:08 AM    BUN/Creatinine ratio 16 05/14/2018 10:08 AM    GFR est AA >60 05/14/2018 10:08 AM    GFR est non-AA >60 05/14/2018 10:08 AM    Calcium 8.2 (L) 05/14/2018 10:08 AM    Bilirubin, total 0.5 05/14/2018 10:08 AM    AST (SGOT) 11 (L) 05/14/2018 10:08 AM    Alk. phosphatase 103 05/14/2018 10:08 AM    Protein, total 7.5 05/14/2018 10:08 AM    Albumin 3.3 (L) 05/14/2018 10:08 AM    Globulin 4.2 (H) 05/14/2018 10:08 AM    A-G Ratio 0.8 05/14/2018 10:08 AM    ALT (SGPT) 10 (L) 05/14/2018 10:08 AM     No results found for: IRON, FE, TIBC, IBCT, PSAT, FERR  No results found for: FOL, RBCF  No results found for: VITD3, Yamilet Mei, VD3RIA        All labs and x-rays reviewed from their ER visit and outside evaluations. It has all been scanned under the media tab or is included within [facility] or Care everywhere. Recent B12 and vit D testing through hematology. Take monthly B12 injections. Assessment:     Juan Allred is a prior gastric bypass   patient who underwent their weight loss surgical procedure procedure approximately 7 years ago. They now have abdominal pain that is persistent in nature. Recommendations:     - Patient to call recent GI office and request EGD results faxed to our office due to not having recent release of information form   - Reviewed case with Dr. Ngoc England, will proceed with Abd/pelvis CT with contrast to evaluate anatomy. Current pain she is having is not typical of cholelithiasis, so will ensure no internal hernia to begin with      25 minutes spent on the phone with patient.     Signed By: Ariel Hanson NP     March 18, 2020

## 2020-03-19 NOTE — PATIENT INSTRUCTIONS
If a test such as an ultrasound or CT scan has been ordered during your visit, and you have not gotten a telephone call within the next 48 hours to schedule, please call Central Scheduling at [phone].

## 2020-05-04 ENCOUNTER — HOSPITAL ENCOUNTER (OUTPATIENT)
Dept: CT IMAGING | Age: 45
Discharge: HOME | End: 2020-05-04
Attending: NURSE PRACTITIONER
Payer: MEDICARE

## 2020-05-04 DIAGNOSIS — R10.84 GENERALIZED ABDOMINAL PAIN: ICD-10-CM

## 2020-05-04 DIAGNOSIS — Z98.84 STATUS POST GASTRIC BYPASS FOR OBESITY: ICD-10-CM

## 2020-05-04 LAB — CREAT UR-MCNC: 0.6 MG/DL (ref 0.6–1.3)

## 2020-05-04 PROCEDURE — 74011636320 HC RX REV CODE- 636/320: Performed by: NURSE PRACTITIONER

## 2020-05-04 PROCEDURE — 74011000255 HC RX REV CODE- 255: Performed by: NURSE PRACTITIONER

## 2020-05-04 PROCEDURE — 74177 CT ABD & PELVIS W/CONTRAST: CPT

## 2020-05-04 PROCEDURE — 82565 ASSAY OF CREATININE: CPT

## 2020-05-04 RX ORDER — BARIUM SULFATE 20 MG/ML
900 SUSPENSION ORAL
Status: COMPLETED | OUTPATIENT
Start: 2020-05-04 | End: 2020-05-04

## 2020-05-04 RX ADMIN — IOPAMIDOL 100 ML: 612 INJECTION, SOLUTION INTRAVENOUS at 13:00

## 2020-05-04 RX ADMIN — Medication 900 ML: at 13:00

## 2020-05-07 ENCOUNTER — OFFICE VISIT (OUTPATIENT)
Dept: SURGERY | Age: 45
End: 2020-05-07

## 2020-05-07 VITALS
SYSTOLIC BLOOD PRESSURE: 140 MMHG | HEIGHT: 65 IN | DIASTOLIC BLOOD PRESSURE: 79 MMHG | WEIGHT: 232.8 LBS | TEMPERATURE: 98.6 F | OXYGEN SATURATION: 99 % | HEART RATE: 89 BPM | BODY MASS INDEX: 38.79 KG/M2

## 2020-05-07 DIAGNOSIS — R10.84 GENERALIZED ABDOMINAL PAIN: Primary | ICD-10-CM

## 2020-05-07 DIAGNOSIS — Z01.812 BLOOD TESTS PRIOR TO TREATMENT OR PROCEDURE: ICD-10-CM

## 2020-05-07 DIAGNOSIS — R10.13 ABDOMINAL PAIN, EPIGASTRIC: Primary | ICD-10-CM

## 2020-05-07 DIAGNOSIS — Z98.84 BARIATRIC SURGERY STATUS: ICD-10-CM

## 2020-05-07 NOTE — PROGRESS NOTES
Surgery Consultation    History of Present Illness:    Lesly Peters is a prior laparoscopic gastric bypass surgery   patient who underwent their weight loss surgical procedure procedure approximately 7 years ago in [place]. They now present with a history of abdominal pain. Onset was 6 weeks ago. The pain is described as crampy and is 6/10 in intensity. Starts in epigastric and LLQ,does radiate t  o back. Symptoms have been worsening  since onset particularly over this past week. . The patient also gives a history of constipation. The patient   denies vomiting. The patient did not go to the emergency room prior to this visit with me for this issue. Consultation was requested for   assistance with evaluation of their symptoms. Of note is the fact that the patient did present to us in late March with these symptoms and a CT scan of the abdomen was ordered but was never obtained until this past Monday. She was having intermittent mild abdominal pain during the full month of April but particularly this pain has increased over the past week.   She now presents for evaluation of her symptoms and to review her CT scan of the abdomen    Past Medical History:   Diagnosis Date    Anemia     has required Fe infusions    Arthritis     Asthma     sporadic inhaler use    Depression     Diabetes ([address]     Dx 1999 / started insulin immediately    GERD (gastroesophageal reflux disease)     Heart failure ([address] 2015    sees Dr. Alesia Becker Heavy menses     Hypercholesterolemia     Hypertension 2015    Intestinal malabsorption     Migraine     Severe obesity with body mass index (BMI) of 35.0 to 39.9 with comorbidity ([address]     Sickle cell trait ([address]     Sleep apnea     uses c-pap    Smoking history     quit summer 2018    Status post gastric bypass for obesity     laparoscopic / PA / Fall of 2013     Past Surgical History:   Procedure Laterality Date    BIOPSY THYROID      benign results    EGD      x 2 for post gastric bypass issues / Dr. Gr [facility])    HX APPENDECTOMY  2006    HX CARPAL TUNNEL RELEASE Right     HX DILATION AND CURETTAGE      HX GASTRIC BYPASS      PA / Fall of     HX HERNIA REPAIR      incisional hernia related to gastric bypass incision      Family History   Problem Relation Age of Onset    Sickle Cell Anemia Mother     GERD Mother      Social History     Socioeconomic History    Marital status:      Spouse name: Not on file    Number of children: Not on file    Years of education: Not on file    Highest education level: Not on file   Tobacco Use    Smoking status: Former Smoker     Types: Cigarettes     Last attempt to quit: 2018     Years since quittin.9    Smokeless tobacco: Never Used   Substance and Sexual Activity    Alcohol use: No    Drug use: No      Current Outpatient Medications   Medication Sig    losartan-hydroCHLOROthiazide (HYZAAR) 100-25 mg per tablet Take 1 Tab by mouth daily.  cyclobenzaprine (FLEXERIL) 10 mg tablet Take 10 mg by mouth nightly.  cyanocobalamin (VITAMIN B-12) 1,000 mcg sublingual tablet Take 1,000 mcg by mouth daily.  OTHER Iron injection weekly    FLUoxetine (PROZAC) 20 mg capsule Take 20 mg by mouth daily.  nortriptyline (PAMELOR) 50 mg capsule Take 100 mg by mouth two (2) times a day.  atorvastatin (LIPITOR) 40 mg tablet Take 40 mg by mouth daily.  montelukast (SINGULAIR) 10 mg tablet Take 10 mg by mouth daily.  albuterol (PROAIR HFA) 90 mcg/actuation inhaler Take 2 Puffs by inhalation every four (4) hours as needed for Wheezing.  budesonide-formoterol (SYMBICORT) 160-4.5 mcg/actuation HFAA Take 2 Puffs by inhalation two (2) times a day.  insulin lispro (HUMALOG U-100 INSULIN) 100 unit/mL injection by SubCUTAneous route Before breakfast, lunch, and dinner.  Sliding scale    insulin glargine (LANTUS SOLOSTAR U-100 INSULIN) 100 unit/mL (3 mL) inpn 42 Units by SubCUTAneous route nightly.  metFORMIN (GLUCOPHAGE) 1,000 mg tablet Take 1,000 mg by mouth two (2) times daily (with meals).  isosorbide mononitrate ER (IMDUR) 30 mg tablet Take 30 mg by mouth daily.  nitroglycerin (NITROSTAT) 0.4 mg SL tablet 0.4 mg by SubLINGual route every five (5) minutes as needed for Chest Pain. Up to 3 doses.  aspirin delayed-release 81 mg tablet Take 81 mg by mouth daily.  lisinopril (PRINIVIL, ZESTRIL) 40 mg tablet Take 40 mg by mouth daily.  amLODIPine (NORVASC) 10 mg tablet Take 10 mg by mouth daily. No current facility-administered medications for this visit. Allergies   Allergen Reactions    Penicillins Anaphylaxis    Tomato Hives       Review of Symptoms:     General - No history or complaints of unexpected fever, chills, or weight loss  Head/Neck - No history or complaints of headache, diplopia, dysphagia, hearing loss  Cardiac - No history or complaints of chest pain, palpitations, murmur, or shortness of breath  Pulmonary - No history or complaints of shortness of breath, productive cough, hemoptysis  Gastrointestinal - As per the HPI above.   Genitourinary - No history or complaints of hematuria/dysuria, stress urinary incontinence symptoms, or renal lithiasis  Musculoskeletal - No history or complaints of joint pain or muscular weakness  Hematologic - No history or complaints of bleeding disorders, blood transfusions, sickle cell anemia  Neurologic - No history or complaints of  migraine headaches, seizure activity, syncopal episodes, TIA or stroke  Integumentary - No history or complaints of rashes, abnormal nevi, skin cancer  Gynecological - No history of heavy menses/abnormal menses        Physical Exam:     Physical Examination: General appearance - alert, well appearing, and in no distress and oriented to person, place, and time  Mental status - alert, oriented to person, place, and time, normal mood, behavior, speech, dress, motor activity, and thought processes  Eyes - pupils equal and reactive, extraocular eye movements intact, sclera anicteric, left eye normal, right eye normal  Ears - right ear normal, left ear normal  Chest - clear to auscultation, no wheezes, rales or rhonchi, symmetric air entry  Heart - normal rate, regular rhythm, normal S1, S2, no murmurs, rubs, clicks or gallops  Abdomen - soft, nontender, nondistended, no masses or organomegaly  Mild discomfort over midepigastric region  Back exam - full range of motion, no tenderness, palpable spasm or pain on motion  Neurological - alert, oriented, normal speech, no focal findings or movement disorder noted  Musculoskeletal - no joint tenderness, deformity or swelling  Extremities - peripheral pulses normal, no pedal edema, no clubbing or cyanosis         Laboratory / X-Rays:      Lab Results   Component Value Date/Time    WBC 9.3 05/14/2018 10:08 AM    HGB 10.4 (L) 05/14/2018 10:08 AM    HCT 33.4 (L) 05/14/2018 10:08 AM    PLATELET 053 07/31/9636 10:08 AM    MCV 75.9 05/14/2018 10:08 AM     Lab Results   Component Value Date/Time    Sodium 140 05/14/2018 10:08 AM    Potassium 3.7 05/14/2018 10:08 AM    Chloride 106 05/14/2018 10:08 AM    CO2 26 05/14/2018 10:08 AM    Anion gap 8 05/14/2018 10:08 AM    Glucose 169 (H) 05/14/2018 10:08 AM    BUN 14 05/14/2018 10:08 AM    Creatinine 0.85 05/14/2018 10:08 AM    BUN/Creatinine ratio 16 05/14/2018 10:08 AM    GFR est AA >60 05/14/2018 10:08 AM    GFR est non-AA >60 05/14/2018 10:08 AM    Calcium 8.2 (L) 05/14/2018 10:08 AM    Bilirubin, total 0.5 05/14/2018 10:08 AM    AST (SGOT) 11 (L) 05/14/2018 10:08 AM    Alk.  phosphatase 103 05/14/2018 10:08 AM    Protein, total 7.5 05/14/2018 10:08 AM    Albumin 3.3 (L) 05/14/2018 10:08 AM    Globulin 4.2 (H) 05/14/2018 10:08 AM    A-G Ratio 0.8 05/14/2018 10:08 AM    ALT (SGPT) 10 (L) 05/14/2018 10:08 AM     No results found for: IRON, FE, TIBC, IBCT, PSAT, FERR  No results found for: FOL, RBCF  No results found for: Yony Valdez, XQVID, VD3RIA        All labs and x-rays reviewed from their ER visit and are included below:     CT ABD PELV W CONT (Accession [id number]) (Order [id number])   Allergies      High: Penicillins   Not Specified: Tomato   Exam Information     Status Exam Begun  Exam Ended    Final [99] 5/04/2020 12:12 5/04/2020 12:53   Result Information     Status: Final result (Exam End: 5/4/2020 12:53) Provider Status: Reviewed   Study Result     EXAM: CT of the abdomen and pelvis     INDICATION: Increasing abdominal bloating. Generalized abdominal pain. Gastric  bypass 2012.     COMPARISON: None     TECHNIQUE: Helical volumetric scanning through the abdomen and pelvis was  performed after oral, with nonionic intravenous contrast.  Axial, coronal and  sagittal reconstructions were created. One or more dose reduction techniques  were used on this CT: automated exposure control, adjustment of the mAs and/or  kVp according to patient size, and iterative reconstruction techniques. The  specific techniques used on this CT exam have been documented in the patient's  electronic medical record. Digital Imaging and Communications in Medicine  (DICOM) format image data are available to nonaffiliated external healthcare  facilities or entities on a secure, media free, reciprocally searchable basis  with patient authorization for at least a 12-month period after this study.              _______________     FINDINGS:     LOWER CHEST: Scattered parenchymal bands are present, scarring versus  atelectasis.     LIVER, BILIARY: Liver is normal. No biliary dilation. Gallbladder is  unremarkable.     PANCREAS: Normal.     SPLEEN: Normal.     ADRENALS: Normal.     KIDNEYS: Normal.     LYMPH NODES: No enlarged lymph nodes.     GASTROINTESTINAL TRACT: Typical changes of gastric bypass are present. All  contrast is present in the proximal gastric pouch, fluid is present in the  excluded stomach.  No bowel dilatation or wall thickening.     PELVIC ORGANS: A rounded 4.6 cm mass is present in the central uterus, its  periphery is isodense with myometrium, its center is low-density in the mass  appears to protrude into the endometrial complex. No other uterine masses  identified. Dominant follicle is present in the right ovary.     VASCULATURE: Minimal calcific atherosclerosis is present. No AAA.     BONES: No acute or aggressive osseous abnormalities identified.     OTHER: No ascites.     _______________     IMPRESSION  IMPRESSION:        1. Gastric bypass. Low volume fluid in the excluded stomach, possibly reflux  from small bowel. No bowel dilatation or ascites. 2. Uterine mass, probable subendometrial fibroid. Endometrial mass not entirely  excluded. Recommend further evaluation with pelvic ultrasound. Assessment:     Iglesia Brewer is a prior laparoscopic gastric bypass surgery   patient who underwent their weight loss surgical procedure procedure   approximately 7 years ago. They now have abdominal pain that is persistent in nature. At this juncture her symptoms have been persistent since March of this year with more recent worsening since her CT scan on Monday. The worrisome part of her CT scan is the volume of fluid within her gastric remnant which is quite atypical.  At this juncture given her persistent symptoms I do feel like a diagnostic laparoscopy is warranted. Planed to the patient the anatomic possibilities might lead to this and she is very comfortable with plan for diagnostic laparoscopy. Recommendations: The diagnosis was discussed with the patient and evaluation and treatment plans outlined. Schedule for diagnostic laparoscopy. Signed By: Iraida Lainez MD     May 7, 2020             Addendum:  5/10/2020    I have called the patient today to discuss her symptomatology since our visit late last week. She has continued to have abdominal pain and emesis that occurs primarily after eating. There is no change in her symptoms. We did discuss the fact that she did see Dr. Jadyn Marshall and he had reviewed an old medicine stress test.  He did perform an echo on her via transesophageal technique and her ejection fraction was 56%. He told her that he felt like her symptoms were related to her anemia and she was subsequently referred to hematology for an iron infusion. She has been totally asymptomatic since then. At this juncture I have reviewed with her her symptoms and the concern that anesthesia has over her prior abnormal stress test.  I explained her that we can put off her case and obtain further work-up by Dr. Jadyn Marshall, however,  she feels like she cannot wait for that. She understands there is some risk to anesthesia given her history of medical issues and prior smoking but she is amenable to that risk. My concern is she has fluid in her gastric remnant which is fairly significant and this likely indicates at least a partial obstruction. She understands things very clearly and does wish to proceed with surgery in the morning.     Dr Royer Vuong

## 2020-05-07 NOTE — PATIENT INSTRUCTIONS
Body Mass Index: Care Instructions  Your Care Instructions    Body mass index (BMI) can help you see if your weight is raising your risk for health problems. It uses a formula to compare how much you weigh with how tall you are. · A BMI lower than 18.5 is considered underweight. · A BMI between 18.5 and 24.9 is considered healthy. · A BMI between 25 and 29.9 is considered overweight. A BMI of 30 or higher is considered obese. If your BMI is in the normal range, it means that you have a lower risk for weight-related health problems. If your BMI is in the overweight or obese range, you may be at increased risk for weight-related health problems, such as high blood pressure, heart disease, stroke, arthritis or joint pain, and diabetes. If your BMI is in the underweight range, you may be at increased risk for health problems such as fatigue, lower protection (immunity) against illness, muscle loss, bone loss, hair loss, and hormone problems. BMI is just one measure of your risk for weight-related health problems. You may be at higher risk for health problems if you are not active, you eat an unhealthy diet, or you drink too much alcohol or use tobacco products. Follow-up care is a key part of your treatment and safety. Be sure to make and go to all appointments, and call your doctor if you are having problems. It's also a good idea to know your test results and keep a list of the medicines you take. How can you care for yourself at home? · Practice healthy eating habits. This includes eating plenty of fruits, vegetables, whole grains, lean protein, and low-fat dairy. · If your doctor recommends it, get more exercise. Walking is a good choice. Bit by bit, increase the amount you walk every day. Try for at least 30 minutes on most days of the week. · Do not smoke. Smoking can increase your risk for health problems. If you need help quitting, talk to your doctor about stop-smoking programs and medicines. These can increase your chances of quitting for good. · Limit alcohol to 2 drinks a day for men and 1 drink a day for women. Too much alcohol can cause health problems. If you have a BMI higher than 25  · Your doctor may do other tests to check your risk for weight-related health problems. This may include measuring the distance around your waist. A waist measurement of more than 40 inches in men or 35 inches in women can increase the risk of weight-related health problems. · Talk with your doctor about steps you can take to stay healthy or improve your health. You may need to make lifestyle changes to lose weight and stay healthy, such as changing your diet and getting regular exercise. If you have a BMI lower than 18.5  · Your doctor may do other tests to check your risk for health problems. · Talk with your doctor about steps you can take to stay healthy or improve your health. You may need to make lifestyle changes to gain or maintain weight and stay healthy, such as getting more healthy foods in your diet and doing exercises to build muscle. Where can you learn more? Go to http://ayana-johanna.info/  Enter S176 in the search box to learn more about \"Body Mass Index: Care Instructions. \"  Current as of: December 10, 2019Content Version: 12.4  © 0519-3411 [employer]. Care instructions adapted under license by [employer] (which disclaims liability or warranty for this information). If you have questions about a medical condition or this instruction, always ask your healthcare professional. [address] any warranty or liability for your use of this information.

## 2020-05-07 NOTE — PROGRESS NOTES
Song Addison presents today for   Chief Complaint   Patient presents with    Abdominal Pain     Pt stated that she is having abd pain since Monday       Is someone accompanying this pt? no    Is the patient using any DME equipment during OV? no    Depression Screening:  No flowsheet data found. Learning Assessment:  Learning Assessment 5/7/2020   PRIMARY LEARNER Patient   HIGHEST LEVEL OF EDUCATION - PRIMARY LEARNER  GRADUATED HIGH SCHOOL OR GED   BARRIERS PRIMARY LEARNER NONE   CO-LEARNER CAREGIVER No   PRIMARY LANGUAGE ENGLISH    NEED No   LEARNER PREFERENCE PRIMARY DEMONSTRATION   LEARNING SPECIAL TOPICS no   ANSWERED BY patient   RELATIONSHIP SELF       Abuse Screening:  No flowsheet data found. Fall Risk  No flowsheet data found. Coordination of Care:  1. Have you been to the ER, urgent care clinic since your last visit? Hospitalized since your last visit? no    2. Have you seen or consulted any other health care providers outside of the [address] [place] [address] since your last visit? Include any pap smears or colon screening.  no

## 2020-05-07 NOTE — H&P (VIEW-ONLY)
Surgery Consultation History of Present Illness:  
 Randolph Raya is a prior laparoscopic gastric bypass surgery 
 patient who underwent their weight loss surgical procedure procedure approximately 7 years ago in [place]. They now present with a history of abdominal pain. Onset was 6 weeks ago. The pain is described as crampy and is 6/10 in intensity. Starts in epigastric and LLQ,does radiate t 
o back. Symptoms have been worsening  since onset particularly over this past week. . The patient also gives a history of constipation. The patient  
denies vomiting. The patient did not go to the emergency room prior to this visit with me for this issue. Consultation was requested for  
assistance with evaluation of their symptoms. Of note is the fact that the patient did present to us in late March with these symptoms and a CT scan of the abdomen was ordered but was never obtained until this past Monday. She was having intermittent mild abdominal pain during the full month of April but particularly this pain has increased over the past week. She now presents for evaluation of her symptoms and to review her CT scan of the abdomen Past Medical History:  
Diagnosis Date  Anemia   
 has required Fe infusions  Arthritis  Asthma   
 sporadic inhaler use  Depression  Diabetes ([address] Dx 1999 / started insulin immediately  GERD (gastroesophageal reflux disease)  Heart failure ([address] 2015  
 sees Dr. Miky Carter  Heavy menses  Hypercholesterolemia  Hypertension 2015  Intestinal malabsorption  Migraine  Severe obesity with body mass index (BMI) of 35.0 to 39.9 with comorbidity ([address]  Sickle cell trait ([address]  Sleep apnea   
 uses c-pap  Smoking history   
 quit summer 2018  Status post gastric bypass for obesity   
 laparoscopic / PA / Fall of 2013 Past Surgical History:  
Procedure Laterality Date  BIOPSY THYROID    
 benign results  EGD x 2 for post gastric bypass issues / Dr. Galindo Sers [facility])  HX APPENDECTOMY  2006  HX CARPAL TUNNEL RELEASE Right  HX DILATION AND CURETTAGE    HX GASTRIC BYPASS    
 PA / Fall of   HX HERNIA REPAIR    
 incisional hernia related to gastric bypass incision Family History Problem Relation Age of Onset  Sickle Cell Anemia Mother  GERD Mother Social History Socioeconomic History  Marital status:  Spouse name: Not on file  Number of children: Not on file  Years of education: Not on file  Highest education level: Not on file Tobacco Use  Smoking status: Former Smoker Types: Cigarettes Last attempt to quit: 2018 Years since quittin.9  Smokeless tobacco: Never Used Substance and Sexual Activity  Alcohol use: No  
 Drug use: No  
  
Current Outpatient Medications Medication Sig  
 losartan-hydroCHLOROthiazide (HYZAAR) 100-25 mg per tablet Take 1 Tab by mouth daily.  cyclobenzaprine (FLEXERIL) 10 mg tablet Take 10 mg by mouth nightly.  cyanocobalamin (VITAMIN B-12) 1,000 mcg sublingual tablet Take 1,000 mcg by mouth daily.  OTHER Iron injection weekly  FLUoxetine (PROZAC) 20 mg capsule Take 20 mg by mouth daily.  nortriptyline (PAMELOR) 50 mg capsule Take 100 mg by mouth two (2) times a day.  atorvastatin (LIPITOR) 40 mg tablet Take 40 mg by mouth daily.  montelukast (SINGULAIR) 10 mg tablet Take 10 mg by mouth daily.  albuterol (PROAIR HFA) 90 mcg/actuation inhaler Take 2 Puffs by inhalation every four (4) hours as needed for Wheezing.  budesonide-formoterol (SYMBICORT) 160-4.5 mcg/actuation HFAA Take 2 Puffs by inhalation two (2) times a day.  insulin lispro (HUMALOG U-100 INSULIN) 100 unit/mL injection by SubCUTAneous route Before breakfast, lunch, and dinner. Sliding scale  insulin glargine (LANTUS SOLOSTAR U-100 INSULIN) 100 unit/mL (3 mL) inpn 42 Units by SubCUTAneous route nightly.  metFORMIN (GLUCOPHAGE) 1,000 mg tablet Take 1,000 mg by mouth two (2) times daily (with meals).  isosorbide mononitrate ER (IMDUR) 30 mg tablet Take 30 mg by mouth daily.  nitroglycerin (NITROSTAT) 0.4 mg SL tablet 0.4 mg by SubLINGual route every five (5) minutes as needed for Chest Pain. Up to 3 doses.  aspirin delayed-release 81 mg tablet Take 81 mg by mouth daily.  lisinopril (PRINIVIL, ZESTRIL) 40 mg tablet Take 40 mg by mouth daily.  amLODIPine (NORVASC) 10 mg tablet Take 10 mg by mouth daily. No current facility-administered medications for this visit. Allergies Allergen Reactions  Penicillins Anaphylaxis  Tomato Hives Review of Symptoms:  
 
General - No history or complaints of unexpected fever, chills, or weight loss Head/Neck - No history or complaints of headache, diplopia, dysphagia, hearing loss Cardiac - No history or complaints of chest pain, palpitations, murmur, or shortness of breath Pulmonary - No history or complaints of shortness of breath, productive cough, hemoptysis Gastrointestinal - As per the HPI above. Genitourinary - No history or complaints of hematuria/dysuria, stress urinary incontinence symptoms, or renal lithiasis Musculoskeletal - No history or complaints of joint pain or muscular weakness Hematologic - No history or complaints of bleeding disorders, blood transfusions, sickle cell anemia Neurologic - No history or complaints of  migraine headaches, seizure activity, syncopal episodes, TIA or stroke Integumentary - No history or complaints of rashes, abnormal nevi, skin cancer Gynecological - No history of heavy menses/abnormal menses Physical Exam:  
 
Physical Examination: General appearance - alert, well appearing, and in no distress and oriented to person, place, and time Mental status - alert, oriented to person, place, and time, normal mood, behavior, speech, dress, motor activity, and thought processes Eyes - pupils equal and reactive, extraocular eye movements intact, sclera anicteric, left eye normal, right eye normal 
Ears - right ear normal, left ear normal 
Chest - clear to auscultation, no wheezes, rales or rhonchi, symmetric air entry Heart - normal rate, regular rhythm, normal S1, S2, no murmurs, rubs, clicks or gallops Abdomen - soft, nontender, nondistended, no masses or organomegaly Mild discomfort over midepigastric region Back exam - full range of motion, no tenderness, palpable spasm or pain on motion Neurological - alert, oriented, normal speech, no focal findings or movement disorder noted Musculoskeletal - no joint tenderness, deformity or swelling Extremities - peripheral pulses normal, no pedal edema, no clubbing or cyanosis Laboratory / X-Rays:  
  
Lab Results Component Value Date/Time WBC 9.3 05/14/2018 10:08 AM  
 HGB 10.4 (L) 05/14/2018 10:08 AM  
 HCT 33.4 (L) 05/14/2018 10:08 AM  
 PLATELET 023 07/43/0793 10:08 AM  
 MCV 75.9 05/14/2018 10:08 AM  
 
Lab Results Component Value Date/Time Sodium 140 05/14/2018 10:08 AM  
 Potassium 3.7 05/14/2018 10:08 AM  
 Chloride 106 05/14/2018 10:08 AM  
 CO2 26 05/14/2018 10:08 AM  
 Anion gap 8 05/14/2018 10:08 AM  
 Glucose 169 (H) 05/14/2018 10:08 AM  
 BUN 14 05/14/2018 10:08 AM  
 Creatinine 0.85 05/14/2018 10:08 AM  
 BUN/Creatinine ratio 16 05/14/2018 10:08 AM  
 GFR est AA >60 05/14/2018 10:08 AM  
 GFR est non-AA >60 05/14/2018 10:08 AM  
 Calcium 8.2 (L) 05/14/2018 10:08 AM  
 Bilirubin, total 0.5 05/14/2018 10:08 AM  
 AST (SGOT) 11 (L) 05/14/2018 10:08 AM  
 Alk.  phosphatase 103 05/14/2018 10:08 AM  
 Protein, total 7.5 05/14/2018 10:08 AM  
 Albumin 3.3 (L) 05/14/2018 10:08 AM  
 Globulin 4.2 (H) 05/14/2018 10:08 AM  
 A-G Ratio 0.8 05/14/2018 10:08 AM  
 ALT (SGPT) 10 (L) 05/14/2018 10:08 AM  
 
No results found for: IRON, FE, TIBC, IBCT, PSAT, FERR 
 No results found for: FOL, RBCF No results found for: Gerardo Cho, Baron Estevez, Diamond Don, [id number] All labs and x-rays reviewed from their ER visit and are included below: 
 
 CT ABD PELV W CONT (Accession [id number]) (Order [id number]) Allergies     
High: Penicillins Not Specified: Tomato Exam Information Status Exam Begun  Exam Ended Final [99] 5/04/2020 12:12 5/04/2020 12:53 Result Information Status: Final result (Exam End: 5/4/2020 12:53) Provider Status: Reviewed Study Result EXAM: CT of the abdomen and pelvis 
  INDICATION: Increasing abdominal bloating. Generalized abdominal pain. Gastric 
bypass 2012. 
  
COMPARISON: None 
  
TECHNIQUE: Helical volumetric scanning through the abdomen and pelvis was 
performed after oral, with nonionic intravenous contrast.  Axial, coronal and 
sagittal reconstructions were created. One or more dose reduction techniques 
were used on this CT: automated exposure control, adjustment of the mAs and/or kVp according to patient size, and iterative reconstruction techniques. The 
specific techniques used on this CT exam have been documented in the patient's 
electronic medical record. Digital Imaging and Communications in Medicine (DICOM) format image data are available to nonaffiliated external healthcare 
facilities or entities on a secure, media free, reciprocally searchable basis 
with patient authorization for at least a 12-month period after this study. 
  
  
  
  
_______________ 
  
FINDINGS: 
  
LOWER CHEST: Scattered parenchymal bands are present, scarring versus 
atelectasis. 
  
LIVER, BILIARY: Liver is normal. No biliary dilation. Gallbladder is 
unremarkable. 
  
PANCREAS: Normal. 
  
SPLEEN: Normal. 
  
ADRENALS: Normal. 
  
KIDNEYS: Normal. 
  
LYMPH NODES: No enlarged lymph nodes. 
  
GASTROINTESTINAL TRACT: Typical changes of gastric bypass are present.  All 
contrast is present in the proximal gastric pouch, fluid is present in the 
 excluded stomach. No bowel dilatation or wall thickening. 
  
PELVIC ORGANS: A rounded 4.6 cm mass is present in the central uterus, its 
periphery is isodense with myometrium, its center is low-density in the mass 
appears to protrude into the endometrial complex. No other uterine masses 
identified. Dominant follicle is present in the right ovary. 
  
VASCULATURE: Minimal calcific atherosclerosis is present. No AAA. 
  
BONES: No acute or aggressive osseous abnormalities identified. 
  
OTHER: No ascites. 
  
_______________ 
  
IMPRESSION IMPRESSION: 
  
  
1. Gastric bypass. Low volume fluid in the excluded stomach, possibly reflux 
from small bowel. No bowel dilatation or ascites. 2. Uterine mass, probable subendometrial fibroid. Endometrial mass not entirely 
excluded. Recommend further evaluation with pelvic ultrasound. Assessment:  
 
Isatu Chamberlain is a prior laparoscopic gastric bypass surgery 
 patient who underwent their weight loss surgical procedure procedure  
approximately 7 years ago. They now have abdominal pain that is persistent in nature. At this juncture her symptoms have been persistent since March of this year with more recent worsening since her CT scan on Monday. The worrisome part of her CT scan is the volume of fluid within her gastric remnant which is quite atypical.  At this juncture given her persistent symptoms I do feel like a diagnostic laparoscopy is warranted. Planed to the patient the anatomic possibilities might lead to this and she is very comfortable with plan for diagnostic laparoscopy. Recommendations: The diagnosis was discussed with the patient and evaluation and treatment plans outlined. Schedule for diagnostic laparoscopy. Signed By: Cade Perez MD   
 May 7, 2020 Addendum:  5/10/2020 I have called the patient today to discuss her symptomatology since our visit late last week. She has continued to have abdominal pain and emesis that occurs primarily after eating. There is no change in her symptoms. We did discuss the fact that she did see Dr. Cm Ruffin and he had reviewed an old medicine stress test.  He did perform an echo on her via transesophageal technique and her ejection fraction was 56%. He told her that he felt like her symptoms were related to her anemia and she was subsequently referred to hematology for an iron infusion. She has been totally asymptomatic since then. At this juncture I have reviewed with her her symptoms and the concern that anesthesia has over her prior abnormal stress test.  I explained her that we can put off her case and obtain further work-up by Dr. Cm Ruffin, however,  she feels like she cannot wait for that. She understands there is some risk to anesthesia given her history of medical issues and prior smoking but she is amenable to that risk. My concern is she has fluid in her gastric remnant which is fairly significant and this likely indicates at least a partial obstruction. She understands things very clearly and does wish to proceed with surgery in the morning. Dr Helder Oliveira

## 2020-05-08 ENCOUNTER — HOSPITAL ENCOUNTER (OUTPATIENT)
Dept: LAB | Age: 45
Discharge: HOME | End: 2020-05-08
Attending: SPECIALIST
Payer: MEDICARE

## 2020-05-08 ENCOUNTER — ANESTHESIA EVENT (OUTPATIENT)
Dept: SURGERY | Age: 45
End: 2020-05-08
Payer: MEDICARE

## 2020-05-08 ENCOUNTER — HOSPITAL ENCOUNTER (OUTPATIENT)
Dept: NON INVASIVE DIAGNOSTICS | Age: 45
Discharge: HOME | End: 2020-05-08
Payer: MEDICARE

## 2020-05-08 LAB
ALBUMIN SERPL-MCNC: 3.4 G/DL (ref 3.4–5)
ALBUMIN/GLOB SERPL: 0.8 {RATIO} (ref 0.8–1.7)
ALP SERPL-CCNC: 149 U/L (ref 45–117)
ALT SERPL-CCNC: 20 U/L (ref 13–56)
ANION GAP SERPL CALC-SCNC: 4 MMOL/L (ref 3–18)
AST SERPL-CCNC: 13 U/L (ref 10–38)
ATRIAL RATE: 70 BPM
BASOPHILS # BLD: 0 K/UL (ref 0–0.1)
BASOPHILS NFR BLD: 0 % (ref 0–2)
BILIRUB SERPL-MCNC: 0.5 MG/DL (ref 0.2–1)
BUN SERPL-MCNC: 10 MG/DL (ref 7–18)
BUN/CREAT SERPL: 12 (ref 12–20)
CALCIUM SERPL-MCNC: 8.8 MG/DL (ref 8.5–10.1)
CALCULATED P AXIS, ECG09: 62 DEGREES
CALCULATED R AXIS, ECG10: -20 DEGREES
CALCULATED T AXIS, ECG11: 179 DEGREES
CHLORIDE SERPL-SCNC: 110 MMOL/L (ref 100–111)
CO2 SERPL-SCNC: 25 MMOL/L (ref 21–32)
CREAT SERPL-MCNC: 0.83 MG/DL (ref 0.6–1.3)
DIAGNOSIS, 93000: NORMAL
DIFFERENTIAL METHOD BLD: ABNORMAL
EOSINOPHIL # BLD: 0.3 K/UL (ref 0–0.4)
EOSINOPHIL NFR BLD: 3 % (ref 0–5)
ERYTHROCYTE [DISTWIDTH] IN BLOOD BY AUTOMATED COUNT: 17.7 % (ref 11.6–14.5)
EST. AVERAGE GLUCOSE BLD GHB EST-MCNC: 169 MG/DL
GLOBULIN SER CALC-MCNC: 4.2 G/DL (ref 2–4)
GLUCOSE SERPL-MCNC: 245 MG/DL (ref 74–99)
HBA1C MFR BLD: 7.5 % (ref 4.2–5.6)
HCG SERPL QL: NEGATIVE
HCT VFR BLD AUTO: 30.8 % (ref 35–45)
HGB BLD-MCNC: 9.8 G/DL (ref 12–16)
LYMPHOCYTES # BLD: 1.6 K/UL (ref 0.9–3.6)
LYMPHOCYTES NFR BLD: 17 % (ref 21–52)
MCH RBC QN AUTO: 24 PG (ref 24–34)
MCHC RBC AUTO-ENTMCNC: 31.8 G/DL (ref 31–37)
MCV RBC AUTO: 75.3 FL (ref 74–97)
MONOCYTES # BLD: 0.6 K/UL (ref 0.05–1.2)
MONOCYTES NFR BLD: 6 % (ref 3–10)
NEUTS SEG # BLD: 7.1 K/UL (ref 1.8–8)
NEUTS SEG NFR BLD: 74 % (ref 40–73)
P-R INTERVAL, ECG05: 182 MS
PLATELET # BLD AUTO: 278 K/UL (ref 135–420)
PMV BLD AUTO: 9.9 FL (ref 9.2–11.8)
POTASSIUM SERPL-SCNC: 3.9 MMOL/L (ref 3.5–5.5)
PROT SERPL-MCNC: 7.6 G/DL (ref 6.4–8.2)
Q-T INTERVAL, ECG07: 416 MS
QRS DURATION, ECG06: 90 MS
QTC CALCULATION (BEZET), ECG08: 449 MS
RBC # BLD AUTO: 4.09 M/UL (ref 4.2–5.3)
SODIUM SERPL-SCNC: 139 MMOL/L (ref 136–145)
VENTRICULAR RATE, ECG03: 70 BPM
WBC # BLD AUTO: 9.7 K/UL (ref 4.6–13.2)

## 2020-05-08 PROCEDURE — 36415 COLL VENOUS BLD VENIPUNCTURE: CPT

## 2020-05-08 PROCEDURE — 83036 HEMOGLOBIN GLYCOSYLATED A1C: CPT

## 2020-05-08 PROCEDURE — 80053 COMPREHEN METABOLIC PANEL: CPT

## 2020-05-08 PROCEDURE — 93005 ELECTROCARDIOGRAM TRACING: CPT

## 2020-05-08 PROCEDURE — 84703 CHORIONIC GONADOTROPIN ASSAY: CPT

## 2020-05-08 PROCEDURE — 85025 COMPLETE CBC W/AUTO DIFF WBC: CPT

## 2020-05-08 PROCEDURE — 87635 SARS-COV-2 COVID-19 AMP PRB: CPT

## 2020-05-09 LAB — SARS-COV-2, COV2NT: NOT DETECTED

## 2020-05-11 ENCOUNTER — ANESTHESIA (OUTPATIENT)
Dept: SURGERY | Age: 45
End: 2020-05-11
Payer: MEDICARE

## 2020-05-11 ENCOUNTER — HOSPITAL ENCOUNTER (OUTPATIENT)
Age: 45
Setting detail: OUTPATIENT SURGERY
Discharge: HOME | End: 2020-05-11
Attending: SPECIALIST | Admitting: SPECIALIST
Payer: MEDICARE

## 2020-05-11 VITALS
BODY MASS INDEX: 37.15 KG/M2 | RESPIRATION RATE: 16 BRPM | OXYGEN SATURATION: 97 % | WEIGHT: 231.13 LBS | SYSTOLIC BLOOD PRESSURE: 140 MMHG | HEIGHT: 66 IN | HEART RATE: 78 BPM | TEMPERATURE: 98.5 F | DIASTOLIC BLOOD PRESSURE: 54 MMHG

## 2020-05-11 DIAGNOSIS — G89.18 POST-OP PAIN: Primary | ICD-10-CM

## 2020-05-11 LAB
GLUCOSE BLD STRIP.AUTO-MCNC: 129 MG/DL (ref 70–110)
GLUCOSE BLD STRIP.AUTO-MCNC: 158 MG/DL (ref 70–110)
HCG UR QL: NEGATIVE

## 2020-05-11 PROCEDURE — 76210000016 HC OR PH I REC 1 TO 1.5 HR: Performed by: SPECIALIST

## 2020-05-11 PROCEDURE — 74011000250 HC RX REV CODE- 250: Performed by: SPECIALIST

## 2020-05-11 PROCEDURE — 76060000033 HC ANESTHESIA 1 TO 1.5 HR: Performed by: SPECIALIST

## 2020-05-11 PROCEDURE — 81025 URINE PREGNANCY TEST: CPT

## 2020-05-11 PROCEDURE — 77030018836 HC SOL IRR NACL ICUM -A: Performed by: SPECIALIST

## 2020-05-11 PROCEDURE — 74011636637 HC RX REV CODE- 636/637: Performed by: ANESTHESIOLOGY

## 2020-05-11 PROCEDURE — 74011000250 HC RX REV CODE- 250: Performed by: NURSE ANESTHETIST, CERTIFIED REGISTERED

## 2020-05-11 PROCEDURE — 77030020829: Performed by: SPECIALIST

## 2020-05-11 PROCEDURE — 74011250636 HC RX REV CODE- 250/636: Performed by: SPECIALIST

## 2020-05-11 PROCEDURE — 77030008683 HC TU ET CUF COVD -A: Performed by: ANESTHESIOLOGY

## 2020-05-11 PROCEDURE — 77030006643: Performed by: ANESTHESIOLOGY

## 2020-05-11 PROCEDURE — 77030008518 HC TBNG INSUF ENDO STRY -B: Performed by: SPECIALIST

## 2020-05-11 PROCEDURE — 77030003578 HC NDL INSUF VERES AMR -B: Performed by: SPECIALIST

## 2020-05-11 PROCEDURE — 74011250636 HC RX REV CODE- 250/636: Performed by: NURSE ANESTHETIST, CERTIFIED REGISTERED

## 2020-05-11 PROCEDURE — 77030014008 HC SPNG HEMSTAT J&J -C: Performed by: SPECIALIST

## 2020-05-11 PROCEDURE — 77030002933 HC SUT MCRYL J&J -A: Performed by: SPECIALIST

## 2020-05-11 PROCEDURE — 77030016151 HC PROTCTR LNS DFOG COVD -B: Performed by: SPECIALIST

## 2020-05-11 PROCEDURE — 74011250636 HC RX REV CODE- 250/636: Performed by: ANESTHESIOLOGY

## 2020-05-11 PROCEDURE — 77030040361 HC SLV COMPR DVT MDII -B: Performed by: SPECIALIST

## 2020-05-11 PROCEDURE — 77030002912 HC SUT ETHBND J&J -A: Performed by: SPECIALIST

## 2020-05-11 PROCEDURE — 82962 GLUCOSE BLOOD TEST: CPT

## 2020-05-11 PROCEDURE — 77030012770 HC TRCR OPT FX AMR -B: Performed by: SPECIALIST

## 2020-05-11 PROCEDURE — 77030020782 HC GWN BAIR PAWS FLX 3M -B: Performed by: SPECIALIST

## 2020-05-11 PROCEDURE — 76010000149 HC OR TIME 1 TO 1.5 HR: Performed by: SPECIALIST

## 2020-05-11 PROCEDURE — 77030008477 HC STYL SATN SLP COVD -A: Performed by: ANESTHESIOLOGY

## 2020-05-11 PROCEDURE — 76210000021 HC REC RM PH II 0.5 TO 1 HR: Performed by: SPECIALIST

## 2020-05-11 RX ORDER — SODIUM CHLORIDE, SODIUM LACTATE, POTASSIUM CHLORIDE, CALCIUM CHLORIDE 600; 310; 30; 20 MG/100ML; MG/100ML; MG/100ML; MG/100ML
75 INJECTION, SOLUTION INTRAVENOUS CONTINUOUS
Status: DISCONTINUED | OUTPATIENT
Start: 2020-05-11 | End: 2020-05-11 | Stop reason: HOSPADM

## 2020-05-11 RX ORDER — ENOXAPARIN SODIUM 100 MG/ML
40 INJECTION SUBCUTANEOUS ONCE
Status: COMPLETED | OUTPATIENT
Start: 2020-05-11 | End: 2020-05-11

## 2020-05-11 RX ORDER — SODIUM CHLORIDE 0.9 % (FLUSH) 0.9 %
5-40 SYRINGE (ML) INJECTION AS NEEDED
Status: DISCONTINUED | OUTPATIENT
Start: 2020-05-11 | End: 2020-05-11 | Stop reason: HOSPADM

## 2020-05-11 RX ORDER — METOCLOPRAMIDE 10 MG/1
10 TABLET ORAL
Qty: 90 TAB | Refills: 0 | Status: SHIPPED | OUTPATIENT
Start: 2020-05-11 | End: 2020-06-10

## 2020-05-11 RX ORDER — SUCCINYLCHOLINE CHLORIDE 100 MG/5ML
SYRINGE (ML) INTRAVENOUS AS NEEDED
Status: DISCONTINUED | OUTPATIENT
Start: 2020-05-11 | End: 2020-05-11 | Stop reason: HOSPADM

## 2020-05-11 RX ORDER — MAGNESIUM SULFATE 100 %
4 CRYSTALS MISCELLANEOUS AS NEEDED
Status: DISCONTINUED | OUTPATIENT
Start: 2020-05-11 | End: 2020-05-11 | Stop reason: HOSPADM

## 2020-05-11 RX ORDER — FLUMAZENIL 0.1 MG/ML
0.2 INJECTION INTRAVENOUS
Status: DISCONTINUED | OUTPATIENT
Start: 2020-05-11 | End: 2020-05-11 | Stop reason: HOSPADM

## 2020-05-11 RX ORDER — DEXAMETHASONE SODIUM PHOSPHATE 4 MG/ML
INJECTION, SOLUTION INTRA-ARTICULAR; INTRALESIONAL; INTRAMUSCULAR; INTRAVENOUS; SOFT TISSUE AS NEEDED
Status: DISCONTINUED | OUTPATIENT
Start: 2020-05-11 | End: 2020-05-11 | Stop reason: HOSPADM

## 2020-05-11 RX ORDER — SODIUM CHLORIDE, SODIUM LACTATE, POTASSIUM CHLORIDE, CALCIUM CHLORIDE 600; 310; 30; 20 MG/100ML; MG/100ML; MG/100ML; MG/100ML
125 INJECTION, SOLUTION INTRAVENOUS CONTINUOUS
Status: DISCONTINUED | OUTPATIENT
Start: 2020-05-11 | End: 2020-05-11 | Stop reason: HOSPADM

## 2020-05-11 RX ORDER — OXYCODONE AND ACETAMINOPHEN 5; 325 MG/1; MG/1
1 TABLET ORAL
Qty: 30 TAB | Refills: 0 | Status: SHIPPED | OUTPATIENT
Start: 2020-05-11 | End: 2020-05-16

## 2020-05-11 RX ORDER — PROPOFOL 10 MG/ML
INJECTION, EMULSION INTRAVENOUS AS NEEDED
Status: DISCONTINUED | OUTPATIENT
Start: 2020-05-11 | End: 2020-05-11 | Stop reason: HOSPADM

## 2020-05-11 RX ORDER — PROCHLORPERAZINE EDISYLATE 5 MG/ML
5 INJECTION INTRAMUSCULAR; INTRAVENOUS
Status: DISCONTINUED | OUTPATIENT
Start: 2020-05-11 | End: 2020-05-11 | Stop reason: HOSPADM

## 2020-05-11 RX ORDER — ONDANSETRON 2 MG/ML
INJECTION INTRAMUSCULAR; INTRAVENOUS AS NEEDED
Status: DISCONTINUED | OUTPATIENT
Start: 2020-05-11 | End: 2020-05-11 | Stop reason: HOSPADM

## 2020-05-11 RX ORDER — HYDROMORPHONE HYDROCHLORIDE 2 MG/ML
0.5 INJECTION, SOLUTION INTRAMUSCULAR; INTRAVENOUS; SUBCUTANEOUS
Status: DISCONTINUED | OUTPATIENT
Start: 2020-05-11 | End: 2020-05-11 | Stop reason: HOSPADM

## 2020-05-11 RX ORDER — DIPHENHYDRAMINE HYDROCHLORIDE 50 MG/ML
12.5 INJECTION, SOLUTION INTRAMUSCULAR; INTRAVENOUS
Status: DISCONTINUED | OUTPATIENT
Start: 2020-05-11 | End: 2020-05-11 | Stop reason: HOSPADM

## 2020-05-11 RX ORDER — MIDAZOLAM HYDROCHLORIDE 1 MG/ML
INJECTION, SOLUTION INTRAMUSCULAR; INTRAVENOUS AS NEEDED
Status: DISCONTINUED | OUTPATIENT
Start: 2020-05-11 | End: 2020-05-11 | Stop reason: HOSPADM

## 2020-05-11 RX ORDER — FENTANYL CITRATE 50 UG/ML
INJECTION, SOLUTION INTRAMUSCULAR; INTRAVENOUS AS NEEDED
Status: DISCONTINUED | OUTPATIENT
Start: 2020-05-11 | End: 2020-05-11 | Stop reason: HOSPADM

## 2020-05-11 RX ORDER — BUPIVACAINE HYDROCHLORIDE AND EPINEPHRINE 5; 5 MG/ML; UG/ML
INJECTION, SOLUTION EPIDURAL; INTRACAUDAL; PERINEURAL AS NEEDED
Status: DISCONTINUED | OUTPATIENT
Start: 2020-05-11 | End: 2020-05-11 | Stop reason: HOSPADM

## 2020-05-11 RX ORDER — ALBUTEROL SULFATE 0.83 MG/ML
2.5 SOLUTION RESPIRATORY (INHALATION)
Status: DISCONTINUED | OUTPATIENT
Start: 2020-05-11 | End: 2020-05-11 | Stop reason: HOSPADM

## 2020-05-11 RX ORDER — NALOXONE HYDROCHLORIDE 0.4 MG/ML
0.1 INJECTION, SOLUTION INTRAMUSCULAR; INTRAVENOUS; SUBCUTANEOUS AS NEEDED
Status: DISCONTINUED | OUTPATIENT
Start: 2020-05-11 | End: 2020-05-11 | Stop reason: HOSPADM

## 2020-05-11 RX ORDER — LIDOCAINE HYDROCHLORIDE 20 MG/ML
INJECTION, SOLUTION EPIDURAL; INFILTRATION; INTRACAUDAL; PERINEURAL AS NEEDED
Status: DISCONTINUED | OUTPATIENT
Start: 2020-05-11 | End: 2020-05-11 | Stop reason: HOSPADM

## 2020-05-11 RX ORDER — CIPROFLOXACIN 2 MG/ML
400 INJECTION, SOLUTION INTRAVENOUS ONCE
Status: COMPLETED | OUTPATIENT
Start: 2020-05-11 | End: 2020-05-11

## 2020-05-11 RX ORDER — KETAMINE HYDROCHLORIDE 10 MG/ML
INJECTION, SOLUTION INTRAMUSCULAR; INTRAVENOUS AS NEEDED
Status: DISCONTINUED | OUTPATIENT
Start: 2020-05-11 | End: 2020-05-11 | Stop reason: HOSPADM

## 2020-05-11 RX ORDER — SODIUM CHLORIDE 0.9 % (FLUSH) 0.9 %
5-40 SYRINGE (ML) INJECTION EVERY 8 HOURS
Status: DISCONTINUED | OUTPATIENT
Start: 2020-05-11 | End: 2020-05-11 | Stop reason: HOSPADM

## 2020-05-11 RX ORDER — INSULIN LISPRO 100 [IU]/ML
INJECTION, SOLUTION INTRAVENOUS; SUBCUTANEOUS ONCE
Status: COMPLETED | OUTPATIENT
Start: 2020-05-11 | End: 2020-05-11

## 2020-05-11 RX ORDER — FENTANYL CITRATE 50 UG/ML
25 INJECTION, SOLUTION INTRAMUSCULAR; INTRAVENOUS AS NEEDED
Status: DISCONTINUED | OUTPATIENT
Start: 2020-05-11 | End: 2020-05-11 | Stop reason: HOSPADM

## 2020-05-11 RX ORDER — ROCURONIUM BROMIDE 10 MG/ML
INJECTION, SOLUTION INTRAVENOUS AS NEEDED
Status: DISCONTINUED | OUTPATIENT
Start: 2020-05-11 | End: 2020-05-11 | Stop reason: HOSPADM

## 2020-05-11 RX ORDER — EPHEDRINE SULFATE/0.9% NACL/PF 50 MG/5 ML
SYRINGE (ML) INTRAVENOUS AS NEEDED
Status: DISCONTINUED | OUTPATIENT
Start: 2020-05-11 | End: 2020-05-11 | Stop reason: HOSPADM

## 2020-05-11 RX ADMIN — MIDAZOLAM 2 MG: 1 INJECTION INTRAMUSCULAR; INTRAVENOUS at 10:23

## 2020-05-11 RX ADMIN — ONDANSETRON HYDROCHLORIDE 4 MG: 2 INJECTION INTRAMUSCULAR; INTRAVENOUS at 11:16

## 2020-05-11 RX ADMIN — FENTANYL CITRATE 100 MCG: 50 INJECTION, SOLUTION INTRAMUSCULAR; INTRAVENOUS at 10:28

## 2020-05-11 RX ADMIN — PROPOFOL 200 MG: 10 INJECTION, EMULSION INTRAVENOUS at 10:29

## 2020-05-11 RX ADMIN — Medication 15 MG: at 10:50

## 2020-05-11 RX ADMIN — Medication 100 MG: at 10:29

## 2020-05-11 RX ADMIN — FENTANYL CITRATE 25 MCG: 50 INJECTION INTRAMUSCULAR; INTRAVENOUS at 12:07

## 2020-05-11 RX ADMIN — KETAMINE HYDROCHLORIDE 20 MG: 10 INJECTION, SOLUTION INTRAMUSCULAR; INTRAVENOUS at 10:37

## 2020-05-11 RX ADMIN — Medication 5 MG: at 10:29

## 2020-05-11 RX ADMIN — Medication 5 MG: at 11:12

## 2020-05-11 RX ADMIN — DEXAMETHASONE SODIUM PHOSPHATE 4 MG: 4 INJECTION, SOLUTION INTRAMUSCULAR; INTRAVENOUS at 11:17

## 2020-05-11 RX ADMIN — INSULIN LISPRO 3 UNITS: 100 INJECTION, SOLUTION INTRAVENOUS; SUBCUTANEOUS at 12:03

## 2020-05-11 RX ADMIN — LIDOCAINE HYDROCHLORIDE 60 MG: 20 INJECTION, SOLUTION EPIDURAL; INFILTRATION; INTRACAUDAL; PERINEURAL at 10:29

## 2020-05-11 RX ADMIN — CIPROFLOXACIN 400 MG: 2 INJECTION, SOLUTION INTRAVENOUS at 10:35

## 2020-05-11 RX ADMIN — FENTANYL CITRATE 100 MCG: 50 INJECTION, SOLUTION INTRAMUSCULAR; INTRAVENOUS at 10:53

## 2020-05-11 RX ADMIN — ENOXAPARIN SODIUM 40 MG: 40 INJECTION SUBCUTANEOUS at 09:45

## 2020-05-11 RX ADMIN — SODIUM CHLORIDE, SODIUM LACTATE, POTASSIUM CHLORIDE, AND CALCIUM CHLORIDE: 600; 310; 30; 20 INJECTION, SOLUTION INTRAVENOUS at 11:07

## 2020-05-11 RX ADMIN — Medication 30 MG: at 10:46

## 2020-05-11 RX ADMIN — KETAMINE HYDROCHLORIDE 10 MG: 10 INJECTION, SOLUTION INTRAMUSCULAR; INTRAVENOUS at 10:47

## 2020-05-11 RX ADMIN — SODIUM CHLORIDE, SODIUM LACTATE, POTASSIUM CHLORIDE, AND CALCIUM CHLORIDE 125 ML/HR: 600; 310; 30; 20 INJECTION, SOLUTION INTRAVENOUS at 09:36

## 2020-05-11 RX ADMIN — SUGAMMADEX 419 MG: 100 INJECTION, SOLUTION INTRAVENOUS at 11:21

## 2020-05-11 NOTE — PERIOP NOTES
Reviewed PTA medication list with patient/caregiver and patient/caregiver denies any additional medications. Patient admits to having a responsible adult care for them at home for at least 24 hours after surgery. Patient encouraged to use gown warming system and informed that using said warming gown to regulate body temperature prior to a procedure has been shown to help reduce the risks of blood clots and infection. Dual skin assessment & fall risk band verification completed with Shamar Thornton RN. Urine pregnancy results negative and verified with Doretha miller rn.

## 2020-05-11 NOTE — DISCHARGE INSTRUCTIONS
DISCHARGE SUMMARY from Nurse    Increase fluids today. Advance diet as tolerated. Take medication as directed, no driving today or while on narcotics. Call for fever greater than 101 with medication, chills, nausea and or vomiting that last longer than 4 hours or any excessive drainage from surgical sites. PATIENT INSTRUCTIONS:    After general anesthesia or intravenous sedation, for 24 hours or while taking prescription Narcotics:  · Limit your activities  · Do not drive and operate hazardous machinery  · Do not make important personal or business decisions  · Do  not drink alcoholic beverages  · If you have not urinated within 8 hours after discharge, please contact your surgeon on call. Report the following to your surgeon:  · Excessive pain, swelling, redness or odor of or around the surgical area  · Temperature over 100.5  · Nausea and vomiting lasting longer than 4 hours or if unable to take medications  · Any signs of decreased circulation or nerve impairment to extremity: change in color, persistent  numbness, tingling, coldness or increase pain  · Any questions    What to do at Home:  Recommended activity: Activity as tolerated and no driving for today or while on narcotics. If you experience any of the following symptoms as noted aboce, please follow up with Dr. Aruna Chu. *  Please give a list of your current medications to your Primary Care Provider. *  Please update this list whenever your medications are discontinued, doses are      changed, or new medications (including over-the-counter products) are added. *  Please carry medication information at all times in case of emergency situations. These are general instructions for a healthy lifestyle:    No smoking/ No tobacco products/ Avoid exposure to second hand smoke  Surgeon General's Warning:  Quitting smoking now greatly reduces serious risk to your health.     Obesity, smoking, and sedentary lifestyle greatly increases your risk for illness    A healthy diet, regular physical exercise & weight monitoring are important for maintaining a healthy lifestyle    You may be retaining fluid if you have a history of heart failure or if you experience any of the following symptoms:  Weight gain of 3 pounds or more overnight or 5 pounds in a week, increased swelling in our hands or feet or shortness of breath while lying flat in bed. Please call your doctor as soon as you notice any of these symptoms; do not wait until your next office visit. The discharge information has been reviewed with the patient and guardian. The patient and caregiver verbalized understanding. Discharge medications reviewed with the patient and caregiver and appropriate educational materials and side effects teaching were provided. Patient armband removed and shredded. Learning About Coronavirus [phone])  Coronavirus [phone]): Overview  What is coronavirus (COVID-19)? The coronavirus disease (COVID-19) is caused by a virus. It is an illness that was first found in [place], [place], in December 2019. It has since spread worldwide. The virus can cause fever, cough, and trouble breathing. In severe cases, it can cause pneumonia and make it hard to breathe without help. It can cause death. Coronaviruses are a large group of viruses. They cause the common cold. They also cause more serious illnesses like Middle East respiratory syndrome (MERS) and severe acute respiratory syndrome (SARS). COVID-19 is caused by a novel coronavirus. That means it's a new type that has not been seen in people before. This virus spreads person-to-person through droplets from coughing and sneezing. It can also spread when you are close to someone who is infected. And it can spread when you touch something that has the virus on it, such as a doorknob or a tabletop. What can you do to protect yourself from coronavirus (COVID-19)?   The best way to protect yourself from getting sick is to:  · Avoid areas where there is an outbreak. · Avoid contact with people who may be infected. · Wash your hands often with soap or alcohol-based hand sanitizers. · Avoid crowds and try to stay at least 6 feet away from other people. · Wash your hands often, especially after you cough or sneeze. Use soap and water, and scrub for at least 20 seconds. If soap and water aren't available, use an alcohol-based hand . · Avoid touching your mouth, nose, and eyes. What can you do to avoid spreading the virus to others? To help avoid spreading the virus to others:  · Cover your mouth with a tissue when you cough or sneeze. Then throw the tissue in the trash. · Use a disinfectant to clean things that you touch often. · Stay home if you are sick or have been exposed to the virus. Don't go to school, work, or public areas. And don't use public transportation. · If you are sick:  ? Leave your home only if you need to get medical care. But call the doctor's office first so they know you're coming. And wear a face mask, if you have one.  ? If you have a face mask, wear it whenever you're around other people. It can help stop the spread of the virus when you cough or sneeze. ? Clean and disinfect your home every day. Use household  and disinfectant wipes or sprays. Take special care to clean things that you grab with your hands. These include doorknobs, remote controls, phones, and handles on your refrigerator and microwave. And don't forget countertops, tabletops, bathrooms, and computer keyboards. When to call for help  Call 911 anytime you think you may need emergency care. For example, call if:  · You have severe trouble breathing. (You can't talk at all.)  · You have constant chest pain or pressure. · You are severely dizzy or lightheaded. · You are confused or can't think clearly. · Your face and lips have a blue color.   · You pass out (lose consciousness) or are very hard to wake up. Call your doctor now if you develop symptoms such as:  · Shortness of breath. · Fever. · Cough. If you need to get care, call ahead to the doctor's office for instructions before you go. Make sure you wear a face mask, if you have one, to prevent exposing other people to the virus. Where can you get the latest information? The following health organizations are tracking and studying this virus. Their websites contain the most up-to-date information. Amanuel Eulalio also learn what to do if you think you may have been exposed to the virus. · U.S. Centers for Disease Control and Prevention (CDC): The CDC provides updated news about the disease and travel advice. The website also tells you how to prevent the spread of infection. www.cdc.gov  · World Health Organization [facility]): WHO offers information about the virus outbreaks. WHO also has travel advice. www.who.int  Current as of: April 1, 2020               Content Version: 12.4  © 4396-8141 [employer]. Care instructions adapted under license by your healthcare professional. If you have questions about a medical condition or this instruction, always ask your healthcare professional. [address] any warranty or liability for your use of this information.           ___________________________________________________________________________________________________________________________________

## 2020-05-11 NOTE — PERIOP NOTES
AVS med list reviewed, no duplicates noted. D/C instructions reviewed, opportunity for questions. Patient armband removed and shredded. DC in wheelchair with CNA and ice pack in place and belongings returned.

## 2020-05-11 NOTE — PERIOP NOTES
Education completed with father, Amanda Retort via telephone, verbalizes understanding and patient instructed and verbalizes understanding.

## 2020-05-11 NOTE — INTERVAL H&P NOTE
Update History & Physical    The Patient's History and Physical of May 7,   2020 was reviewed with the patient and I examined the patient. There was no change. The surgical site was confirmed by the patient and me. Plan:  The risk, benefits, expected outcome, and alternative to the recommended procedure have been discussed with the patient. Patient understands and wants to proceed with the procedure.     Electronically signed by Dustin Muniz MD on 5/11/2020 at 8:45 AM

## 2020-05-11 NOTE — OP NOTES
[facility]  OPERATIVE REPORT    Name:  Da Carmen  MR#:   [id number]  :  1975  ACCOUNT #:  [de-identified]  DATE OF SERVICE:  2020    PREOPERATIVE DIAGNOSIS:  Chronic abdominal pain with evidence of probable partial bowel obstruction on CT scan of the abdomen. POSTOPERATIVE DIAGNOSES:  1. Chronic abdominal pain with evidence of probable partial bowel obstruction on CT scan of the abdomen, with extensive adhesive disease. 2.  Malpositioned afferent limb of Linda anastomosis on medial aspect of the limb. PROCEDURES PERFORMED:  1. Diagnostic laparoscopy. 2.  Laparoscopic lysis of adhesions in excess of 45 minutes' duration. SURGEON:  Papito Andre MD    ASSISTANT:  Livier Pereira PA-C MS.   PA, Chio Bishop, assisted with the procedure since there were no qualified surgeons, interns, or residents available. He assisted with exposure during the procedure, adhesiolysis, closure of skin and fascial incisions. ANESTHESIA:  General endotracheal.    COMPLICATIONS:  None. SPECIMENS REMOVED:  None. IMPLANTS:  none    ESTIMATED BLOOD LOSS:  Scant. FINDINGS:  The patient did have a gastric bypass procedure with an anteriorly-placed Linda limb anterior to the transverse mesocolon. The patient had a Linda anastomosis of the afferent limb on the medial aspect of the Linda limb, that was severely kinked under the transverse mesocolon. STATEMENT OF MEDICAL NECESSITY:  The patient is a 65-year-old female who had her gastric bypass procedure performed elsewhere in [place], [place]. She has had bouts of chronic abdominal pain over the past several months, which have worsened typically over the past month. She was evaluated by a CT scan of the abdomen recently, and referred to us in consultation for the findings on the CT. She has had worsening of her pain, it has been persistent.   She was found to have fluid within her gastric remnant, which was fairly significant and, at this time period, due to the persistent nature of her pain and the CT findings, we are going to proceed with laparoscopy. PROCEDURE:  The patient was brought to the operating room and placed on the table in supine position, at which time general anesthesia was administered without any difficulty. Her abdomen was then prepped and draped in the usual sterile fashion. Using a 15 blade, a 1-cm incision was made just to the left of the umbilicus. Veress needle approach was used to gain access to the peritoneal cavity which was then insufflated. Once that was achieved, I then placed a VisiPort at that site, and the abdomen was then re-insufflated. Two trocars were placed in the right upper quadrant region and a single trocar was placed in the left upper quadrant region. On entering the abdomen, the patient's anatomy was not clearly discerned. She had significant central obesity present, and the first thing I had to do was locate the Linda limb. Linda limb did course anterior to the transverse colon. There was omentum which was right over the Linda limb, and I could visualize the Linda limb going in the left subhepatic space and does not kink. We then began to run the Linda limb out distally and we were able to follow it out distally, but then it curved to the right upper quadrant region in a very abrupt fashion. I could visualize the Linda anastomosis at that point, and it was quite kinked in nature and getting severely adherent to the transverse mesocolon. I then made an attempt to expose the afferent limb, but I could not do so. Therefore, I knew I would have to first dissect out this entire Linda limb and afferent limb. I was able to do so very slowly, in a very arduous fashion using dissection anthony. I followed the Linda limb downward, freeing it up from the transverse colon.   I then freed up the efferent limb from the transverse mesocolon, and then was able to finally follow the afferent limb back in a retrograde fashion. It was obvious that the surgeon who performed this operation created a situation such that he pulled the efferent limb through Gallego's defect and anastomosed this to the medial aspect of the Linda limb, which had caused the severe kinking effect at the anastomosis. The  adhesiolysis in total, to free up this entire area, took about 45 minutes to achieve, and what I was left with was the afferent limb passing through Phelan's defect, being anastomosed on the medial aspect of the Linda limb. I was able to straighten this out and bring the Linda anastomosis back towards the mid abdomen, but essentially this anatomy was conducive to an internal hernia. At this juncture, the option was to see if the patient would have her symptoms alleviated by what we had performed already versus performing a more major operation, which would include taking down the entire Linda anastomosis, then bringing the afferent limb back through Phelan's defect, and anastomosing it on the lateral side of the Linda limb itself. We opted for the lesser technique since we were in still the midst of the Coronavirus epidemic, did not want to perform an operation that was much larger than expected and have the risk of bleeding, as this was certainly an unexpected finding in this operation. We did place Surgicel over all the raw surface areas to prevent adhesions. The plan was to simply see how the patient does with this operation and, at a later date if she continues to be quite symptomatic, we will go ahead and bring her back to the operating room for total revision of her small-bowel anastomosis. All areas were hemostatic. I then removed all trocars under direct visualization and re-closed all skin incisions using 4-0 subcuticular Monocryl. Steri-Strips  and sterile dressings were applied. The patient tolerated the procedure well.       Priscila Delgado MD      [id number]  D:  05/11/2020 16:42  T:  05/11/2020 19:06  JOB #:  [id number]

## 2020-05-11 NOTE — BRIEF OP NOTE
Brief Postoperative Note    Patient: Marianela Robles  YOB: 1975  MRN: [id number]    Date of Procedure: 5/11/2020     Pre-Op Diagnosis: EPIGASTRIC PAIN, POST BARIATRIC SURGERY    Post-Op Diagnosis: extensive adhesive disease      Procedure(s):  DIAGNOSTIC LAPAROSCOPY  LYSIS OF ADHESIONS GREATER THAN 45 MINUTES    Surgeon(s):  Julian Hinojosa MD    Surgical Assistant: Physician Assistant: KALYAN Andrade    Anesthesia: General     Estimated Blood Loss (mL): Minimal    Complications: None    Specimens: * No specimens in log *     Implants: * No implants in log *    Drains:   Orogastric Tube 05/11/20 (Active)       Findings: SEE DICTATION    Electronically Signed by Toni Delgadillo MD on 5/11/2020 at 11:20 AM

## 2020-05-11 NOTE — ANESTHESIA POSTPROCEDURE EVALUATION
Post-Anesthesia Evaluation and Assessment    Patient: Isatu Chamberlain MRN: [id number]  SSN: [id number]   YOB: 1975  Age: 54 y.o. Sex: female      Cardiovascular Function/Vital Signs  /62   Pulse 76   Temp 36.5 °C (97.7 °F)   Resp 22   Ht 5' 6\" (1.676 m)   Wt 104.8 kg (231 lb 2 oz)   SpO2 94%   BMI 37.30 kg/m²     Patient is status post Procedure(s):  DIAGNOSTIC LAPAROSCOPY, LYSIS OF ADHESIONS **SPEC POP**. Nausea/Vomiting: Controlled. Postoperative hydration reviewed and adequate. Pain:  Pain Scale 1: FLACC(asleep) (05/11/20 1213)  Pain Intensity 1: 0 (05/11/20 1213)   Managed. Neurological Status:   Neuro (WDL): Within Defined Limits (05/11/20 1213)   At baseline. Mental Status and Level of Consciousness: Arousable. Pulmonary Status:   O2 Device: Room air (05/11/20 1233)   Adequate oxygenation and airway patent. Complications related to anesthesia: None    Post-anesthesia assessment completed. No concerns. Patient has met all discharge requirements. Signed By: Tristan Gonzales CRNA    May 11, 2020             Procedure(s):  DIAGNOSTIC LAPAROSCOPY, LYSIS OF ADHESIONS **SPEC POP**.    general    <BSHSIANPOST>    Vitals Value Taken Time   /58 5/11/2020 12:40 PM   Temp 36.5 °C (97.7 °F) 5/11/2020 12:13 PM   Pulse 80 5/11/2020 12:45 PM   Resp 23 5/11/2020 12:45 PM   SpO2 93 % 5/11/2020 12:45 PM   Vitals shown include unvalidated device data.

## 2020-05-12 ENCOUNTER — TELEPHONE (OUTPATIENT)
Dept: SURGERY | Age: 45
End: 2020-05-12

## 2020-05-12 NOTE — TELEPHONE ENCOUNTER
This RN spoke with patient post-operatively. Diet: Patient tolerated jello yesterday. Nausea and/or vomitting: None    Pain: Currently managed with Percocet/Benedryl    Lap sites: No erythema, drainage, and/or swelling    BM: None to date, but is passing flatus. Ambulation: Patient is walking throughout house every hour. IS: Patient wasn't given one. RN encouraged coughing and deep breathing 10 x's every hour while awake. Patient verbalized understanding. Temperature: Patient has a thermometer at home. RN encouraged her to monitor her temperature daily and report a temperature above 100.4 degrees. Patient verbalized understanding. Questions: None    Patient is currently taking Reglan. This RN reminded the patient to contact the office with any questions and/or concerns. Patient verbalized understanding. Patient's two week post-op visit is scheduled and was confirmed.

## 2020-05-26 ENCOUNTER — VIRTUAL VISIT (OUTPATIENT)
Dept: SURGERY | Age: 45
End: 2020-05-26

## 2020-05-26 VITALS — HEIGHT: 66 IN | BODY MASS INDEX: 37.28 KG/M2 | WEIGHT: 232 LBS

## 2020-05-26 DIAGNOSIS — Z98.890 S/P LAPAROSCOPIC PROCEDURE: Primary | ICD-10-CM

## 2020-05-26 NOTE — PROGRESS NOTES
Subjective:      Kyle Canales is a 54 y.o. female is now 2 weeks status post diagnostic laparoscopy with NIKKO >45 minutes. Doing well overall. Body mass index is 37.45 kg/m². Currently on a regular diet without difficulty. Walking around house. Bowel movements are regular. The patient is not having any pain. . The patient is compliant with multivitamins. Surgery related complications: NA.      Weight Loss Metrics 5/26/2020 5/11/2020 5/8/2020 5/7/2020 3/18/2020 9/12/2018 8/31/2018   Today's Wt 232 lb 231 lb 2 oz 232 lb 232 lb 12.8 oz 231 lb 236 lb 8 oz 239 lb 9.6 oz   BMI 37.45 kg/m2 37.3 kg/m2 37.45 kg/m2 38.74 kg/m2 38.44 kg/m2 39.36 kg/m2 39.87 kg/m2            Denies diagnosis of COVID-19 since surgery.      Patient Active Problem List   Diagnosis Code    High cholesterol E78.00    Peripheral neuropathy G62.9    Asthma J45.909    Anxiety F41.9    Acid reflux K21.9    Depression F32.9    HTN (hypertension) I10    Carpal tunnel syndrome of right wrist G56.01    Cubital tunnel syndrome on right G56.21    CTS (carpal tunnel syndrome) G56.00    Hypertension I10    Heart failure (HCC) I50.9    GERD (gastroesophageal reflux disease) K21.9    Diabetes ([facility]) E11.9    Arthritis M19.90    Severe obesity with body mass index (BMI) of 35.0 to 39.9 with comorbidity ([facility]) E66.01    Status post gastric bypass for obesity Z98.84    Intestinal malabsorption K90.9    Hypercholesterolemia E78.00    Sleep apnea G47.30    Migraine G43.909    Anemia D64.9    Heavy menses N92.0    Sickle cell trait (HCC) D57.3    Smoking history Z87.891    Generalized abdominal pain R10.84        Past Medical History:   Diagnosis Date    Anemia     has required Fe infusions    Arthritis     Asthma     sporadic inhaler use    Depression     Diabetes ([address]     Dx 1999 / started insulin immediately    GERD (gastroesophageal reflux disease)     Heart failure ([facility] [address] 2015    sees Dr. Vida Wallace Heavy menses     Hypercholesterolemia     Hypertension 2015    Intestinal malabsorption     Migraine     Severe obesity with body mass index (BMI) of 35.0 to 39.9 with comorbidity (HCC)     Sickle cell trait (HCC)     Sleep apnea     No CPAP    Smoking history     quit summer 2018    Status post gastric bypass for obesity     laparoscopic / PA / Fall of 2013       Past Surgical History:   Procedure Laterality Date    BIOPSY THYROID      benign results    EGD      x 2 for post gastric bypass issues / Dr. Galindo Sers [facility])    HX APPENDECTOMY  2006    HX CARPAL TUNNEL RELEASE Right     HX DILATION AND CURETTAGE  2001    HX GASTRIC BYPASS      PA / Fall of 2013    HX HERNIA REPAIR      incisional hernia related to gastric bypass incision       Current Outpatient Medications   Medication Sig Dispense Refill    metoclopramide HCl (REGLAN) 10 mg tablet Take 1 Tab by mouth Before breakfast, lunch, and dinner for 30 days. 90 Tab 0    potassium chloride SR (KLOR-CON 10) 10 mEq tablet Take 20 mEq by mouth daily.  losartan (COZAAR) 100 mg tablet Take 100 mg by mouth daily.  furosemide (Lasix) 40 mg tablet Take 40 mg by mouth daily. Indications: CHF      omeprazole (PRILOSEC) 40 mg capsule Take 40 mg by mouth daily.  plecanatide (Trulance) 3 mg tab Take 3 mg by mouth daily.  fluticasone propionate (Flonase Allergy Relief) 50 mcg/actuation nasal spray 2 Sprays by Both Nostrils route nightly.  fexofenadine (ALLEGRA) 180 mg tablet Take 180 mg by mouth daily.  ergocalciferol (ERGOCALCIFEROL) 1,250 mcg (50,000 unit) capsule Take 50,000 Units by mouth every Monday.  cyanocobalamin (Vitamin B-12) 1,000 mcg/mL injection 1,000 mcg by IntraMUSCular route every thirty (30) days.  OTHER Iron infusion every 2 weeks      atorvastatin (LIPITOR) 40 mg tablet Take 40 mg by mouth daily.  montelukast (SINGULAIR) 10 mg tablet Take 10 mg by mouth daily.       albuterol (PROAIR HFA) 90 mcg/actuation inhaler Take 2 Puffs by inhalation every four (4) hours as needed for Wheezing.  budesonide-formoterol (SYMBICORT) 160-4.5 mcg/actuation HFAA Take 2 Puffs by inhalation two (2) times a day.  insulin lispro (HUMALOG U-100 INSULIN) 100 unit/mL injection by SubCUTAneous route Before breakfast, lunch, and dinner. Sliding scale      insulin glargine (LANTUS SOLOSTAR U-100 INSULIN) 100 unit/mL (3 mL) inpn 36 Units by SubCUTAneous route nightly.  metFORMIN (GLUCOPHAGE) 1,000 mg tablet Take 1,000 mg by mouth two (2) times daily (with meals).  isosorbide mononitrate ER (IMDUR) 30 mg tablet Take 30 mg by mouth daily.  nitroglycerin (NITROSTAT) 0.4 mg SL tablet 0.4 mg by SubLINGual route every five (5) minutes as needed for Chest Pain. Up to 3 doses.  aspirin delayed-release 81 mg tablet Take 81 mg by mouth daily.  amLODIPine (NORVASC) 10 mg tablet Take 10 mg by mouth daily.          Allergies   Allergen Reactions    Penicillins Anaphylaxis    Tomato Hives       Review of Symptoms:       General - No history or complaints of unexpected fever or chills  Head/Neck - No history or complaints of headache or dizziness  Cardiac - No history or complaints of chest pain, palpitations, or shortness of breath  Pulmonary - No history or complaints of shortness of breath or productive cough  Gastrointestinal - as noted above  Genitourinary - No history or complaints of hematuria/dysuria or renal lithiasis  Musculoskeletal - No history or complaints of joint  muscular weakness  Hematologic - No history of any bleeding episodes  Neurologic - No history or complaints of  migraine headaches or neurologic symptoms        Objective:     Visit Vitals   5' 6\" (1.676 m)   Wt 105.2 kg (232 lb)   BMI 37.45 kg/m²     Physical Examination:   General appearance - well appearing and in no distress  Mental status - alert, oriented to person, place, and time  Pulmonary - normal respiratory effort  Abdomen - no obvious distention  Neurological - normal speech, no focal findings or movement disorder noted  Extremities - normal movement  Musculoskeletal - moving extremities without difficulty  Skin - no rashes, no suspicious skin lesions noted      Assessment:   History of Morbid obesity, status post diagnostic laparoscopy. Doing well postoperatively. Plan:     1. Increase activity to the goal of 30 minutes daily and Increase fluids  2. Advance diet to soft solid phase. Reminded to measure portions, continue high protein, low carbohydrate diet. Reminded to eat regularly, to eat slowly & not to drink with meals. Refer to the handbook given in class. 3. Continue multivitamin   4. Continue current medications and follow up with PCP for management of regimen. 5. I have discussed this plan with patient and they verbalized understanding  6. Follow up in 3 months or sooner if patient has questions, concerns or worsening of condition, if unable to reach our office, patient should report to the ED. 7. Ms. Ronaldo Luciano has a reminder for a \"due or due soon\" health maintenance. I have asked that she contact her primary care provider for a follow-up on this health maintenance. This visit with Ms Ronaldo Luciano was performed under virtual telemedicine guidelines during the coronavirus (TATMM-62) public health emergency on 5/26/20 in an interactive fashion using [employer] me. They understand that this telemedicine encounter is a billable service, with coverage determined by their insurance carrier. They are aware that they may receive a bill and have provided verbal consent for this virtual visit. This visit was performed with the patient in their home environment and provider was present at [facility]. I have spent over 30 minutes on this visit  both prior to the visit reviewing the patients chart and with the patient face to face. I have reviewed their medical history, performed a telemedicine physical examination, and discussed the plan of action to date. They understand that they will be asked to come to the office when our office is allowed normal patient interaction, as dictated by public health officials, for a face-to-face visit to rediscuss all of the things we have talked about today.

## 2021-02-04 ENCOUNTER — OFFICE VISIT (OUTPATIENT)
Dept: SURGERY | Age: 46
End: 2021-02-04
Payer: MEDICARE

## 2021-02-04 VITALS
HEIGHT: 66 IN | WEIGHT: 230.19 LBS | OXYGEN SATURATION: 100 % | TEMPERATURE: 98.4 F | BODY MASS INDEX: 36.99 KG/M2 | HEART RATE: 68 BPM | DIASTOLIC BLOOD PRESSURE: 66 MMHG | SYSTOLIC BLOOD PRESSURE: 166 MMHG

## 2021-02-04 DIAGNOSIS — K90.9 INTESTINAL MALABSORPTION, UNSPECIFIED TYPE: Primary | ICD-10-CM

## 2021-02-04 DIAGNOSIS — I10 HYPERTENSION, UNSPECIFIED TYPE: ICD-10-CM

## 2021-02-04 DIAGNOSIS — K21.9 GASTROESOPHAGEAL REFLUX DISEASE WITHOUT ESOPHAGITIS: ICD-10-CM

## 2021-02-04 DIAGNOSIS — Z98.84 STATUS POST GASTRIC BYPASS FOR OBESITY: ICD-10-CM

## 2021-02-04 DIAGNOSIS — E55.9 HYPOVITAMINOSIS D: ICD-10-CM

## 2021-02-04 DIAGNOSIS — D64.9 ANEMIA, UNSPECIFIED TYPE: ICD-10-CM

## 2021-02-04 PROCEDURE — 99214 OFFICE O/P EST MOD 30 MIN: CPT | Performed by: NURSE PRACTITIONER

## 2021-02-04 RX ORDER — LANSOPRAZOLE 30 MG/1
CAPSULE, DELAYED RELEASE ORAL
COMMUNITY

## 2021-02-04 RX ORDER — SUCRALFATE 1 G/1
1 TABLET ORAL 4 TIMES DAILY
Qty: 120 TAB | Refills: 1 | Status: SHIPPED | OUTPATIENT
Start: 2021-02-04 | End: 2021-03-02 | Stop reason: ALTCHOICE

## 2021-02-04 NOTE — PROGRESS NOTES
Subjective:   Macy Bergeron  is a 54 y.o. female who presents for follow-up about 8 years following laparoscopic gastric bypass surgery done in [place]. Surgery related complication: NA.  Has had chronic abdominal pain that led most recently to diagnostic lap May 2020. After which, she had about a 5 month period without pain. Now returned to supra umbilical/mid abdomen. Vomiting 3 times a week right after eating. Eating less fried food. More regular bowel movements, less constipation. GI following and has f/up 2/15th. Reflux medications not working as well. Last EGD 2107 by Harry Villareal  - dilation of distal esophageal stricture to 20mm  - no evidence of Ocasio's  - small hiatal hernia      Is smoking 2-3 black and milds a day      She is tolerating solid foods with difficulty, reports vomiting, abdominal pain and reflux. Fluid intake:  fair                              Protein intake:  poor  Not eating regularly. The patient is taking recommended vitamins. The patient's exercise level: not active. Changes in her medical history and medications have been reviewed. Getting new OB/GYN, heavier menses. Has known fibroid. Cardiology has stopped BP medications for planned Holter monitor today to evaluate for possible arrhythmia. CT 5/4/20  1. Gastric bypass. Low volume fluid in the excluded stomach, possibly reflux  from small bowel. No bowel dilatation or ascites. 2. Uterine mass, probable subendometrial fibroid. Endometrial mass not entirely  excluded. Recommend further evaluation with pelvic ultrasound. 5/11/20 had diagnostic laparoscopy performed when concern for possible SBO on CT done:  POSTOPERATIVE DIAGNOSES:  1. Chronic abdominal pain with evidence of probable partial bowel obstruction on CT scan of the abdomen, with extensive adhesive disease. 2.  Malpositioned afferent limb of Linda anastomosis on medial aspect of the limb.     PROCEDURES PERFORMED:  1. Diagnostic laparoscopy.   2. Laparoscopic lysis of adhesions in excess of 45 minutes' duration. The plan was to simply see how the patient does with this operation and, at a later date if she continues to be quite symptomatic, we will go ahead and bring her back to the operating room for total revision of her small-bowel anastomosis.      Patient Active Problem List   Diagnosis Code    High cholesterol E78.00    Peripheral neuropathy G62.9    Asthma J45.909    Anxiety F41.9    Acid reflux K21.9    Depression F32.9    HTN (hypertension) I10    Carpal tunnel syndrome of right wrist G56.01    Cubital tunnel syndrome on right G56.21    CTS (carpal tunnel syndrome) G56.00    Hypertension I10    Heart failure (HCC) I50.9    GERD (gastroesophageal reflux disease) K21.9    Diabetes (HCC) E11.9    Arthritis M19.90    Severe obesity with body mass index (BMI) of 35.0 to 39.9 with comorbidity (HCC) E66.01    Status post gastric bypass for obesity Z98.84    Intestinal malabsorption K90.9    Hypercholesterolemia E78.00    Sleep apnea G47.30    Migraine G43.909    Anemia D64.9    Heavy menses N92.0    Sickle cell trait (HCC) D57.3    Smoking history Z87.891    Generalized abdominal pain R10.84    S/P laparoscopic procedure P62.462     Past Medical History:   Diagnosis Date    Anemia     has required Fe infusions    Arthritis     Asthma     sporadic inhaler use    Depression     Diabetes ([address]     Dx 1999 / started insulin immediately    GERD (gastroesophageal reflux disease)     Heart failure ([facility] [address] 2015    sees Dr. David Smith Heavy menses     Hypercholesterolemia     Hypertension 2015    Intestinal malabsorption     Migraine     Severe obesity with body mass index (BMI) of 35.0 to 39.9 with comorbidity (HCC)     Sickle cell trait (HCC)     Sleep apnea     No CPAP    Smoking history     quit summer 2018    Status post gastric bypass for obesity     laparoscopic / PA / Fall of 2013     Past Surgical History: Procedure Laterality Date    BIOPSY THYROID      benign results    EGD      x 2 for post gastric bypass issues / Dr. Navin Leos [facility])    HX APPENDECTOMY  2006    HX CARPAL TUNNEL RELEASE Right     HX DILATION AND CURETTAGE  2001    HX GASTRIC BYPASS      PA / Fall of 2013    HX HERNIA REPAIR      incisional hernia related to gastric bypass incision     Current Outpatient Medications   Medication Sig Dispense Refill    potassium chloride SR (KLOR-CON 10) 10 mEq tablet Take 20 mEq by mouth daily.  losartan (COZAAR) 100 mg tablet Take 100 mg by mouth daily.  furosemide (Lasix) 40 mg tablet Take 40 mg by mouth daily. Indications: CHF      omeprazole (PRILOSEC) 40 mg capsule Take 40 mg by mouth daily.  plecanatide (Trulance) 3 mg tab Take 3 mg by mouth daily.  fluticasone propionate (Flonase Allergy Relief) 50 mcg/actuation nasal spray 2 Sprays by Both Nostrils route nightly.  fexofenadine (ALLEGRA) 180 mg tablet Take 180 mg by mouth daily.  ergocalciferol (ERGOCALCIFEROL) 1,250 mcg (50,000 unit) capsule Take 50,000 Units by mouth every Monday.  cyanocobalamin (Vitamin B-12) 1,000 mcg/mL injection 1,000 mcg by IntraMUSCular route every thirty (30) days.  OTHER Iron infusion every 2 weeks      atorvastatin (LIPITOR) 40 mg tablet Take 40 mg by mouth daily.  montelukast (SINGULAIR) 10 mg tablet Take 10 mg by mouth daily.  albuterol (PROAIR HFA) 90 mcg/actuation inhaler Take 2 Puffs by inhalation every four (4) hours as needed for Wheezing.  budesonide-formoterol (SYMBICORT) 160-4.5 mcg/actuation HFAA Take 2 Puffs by inhalation two (2) times a day.  insulin lispro (HUMALOG U-100 INSULIN) 100 unit/mL injection by SubCUTAneous route Before breakfast, lunch, and dinner. Sliding scale      insulin glargine (LANTUS SOLOSTAR U-100 INSULIN) 100 unit/mL (3 mL) inpn 36 Units by SubCUTAneous route nightly.       metFORMIN (GLUCOPHAGE) 1,000 mg tablet Take 1,000 mg by mouth two (2) times daily (with meals).  isosorbide mononitrate ER (IMDUR) 30 mg tablet Take 30 mg by mouth daily.  nitroglycerin (NITROSTAT) 0.4 mg SL tablet 0.4 mg by SubLINGual route every five (5) minutes as needed for Chest Pain. Up to 3 doses.  aspirin delayed-release 81 mg tablet Take 81 mg by mouth daily.  amLODIPine (NORVASC) 10 mg tablet Take 10 mg by mouth daily.          Review of Systems:  General - Denies fatigue, fever, chills  Cardiac - Denies chest pain, palpitations, shortness of breath  Pulmonary - Denies shortness of breath, productive cough  GI - as noted above  Musculoskeletal - Denies joint or muscular weakness, pain, stiffness  Hematologic - Denies abnormal bleeding, bruising  Neurologic -  Denies weakness, paralysis, numbness, tingling    Objective:     Visit Vitals  Ht 5' 6\" (1.676 m)   Wt 104.4 kg (230 lb 3 oz)   BMI 37.15 kg/m²        Physical Exam:      General appearance:  alert, cooperative, no distress, appears stated age   Mental status   alert, oriented to person, place, and time   Neck  supple, no significant adenopathy     Lymphatics  no palpable lymphadenopathy, no hepatosplenomegaly   Chest  clear to auscultation, no wheezes, rales or rhonchi, symmetric air entry   Heart  normal rate, regular rhythm, normal S1, S2, no murmurs, rubs, clicks or gallops    Abdomen: soft, nontender, nondistended, no masses or organomegaly   Incision:  healing well, no drainage, no erythema, no hernia, no seroma, no swelling, no dehiscence, incision well approximated      Neurological  alert, oriented, normal speech, no focal findings or movement disorder noted   Musculoskeletal no joint tenderness, deformity or swelling   Extremities peripheral pulses normal, no pedal edema, no clubbing or cyanosis   Skin normal coloration and turgor, no rashes, no suspicious skin lesions noted      Labs:     No results found for this or any previous visit (from the past 2016 hour(s)). Assessment and Plan:   1. Intestinal malabsorption  a. continue required Vitamins: B12, B complex, D, iron, calcium, multivitamin  2. S/p laparoscopic bariatric surgery, GASTRIC BYPASS, history of morbid obesity  a. Sleep goal is 7-9 hours each night. Patient education given on the effects of sleep deprivation on weight control. b. Discussed patients weight loss goals and dietary choices in relation to goals. c. Reminded to measure portions, continue high protein, low carbohydrate diet. Reminded to eat regularly, to eat slowly & not to drink with meals. d. Continue cardio exercise and add resistance exercises. 60-90 minutes of aerobic activity 5 days a week and strength training 2 days each week. e. Encouraged to attend support group   f. Required fluid intake is >64oz daily of decaffeinated sugar free beverages. g.   3. Abdominal Pain  Patient is smoking and aware she must stop given risk of ulcerations and likely having symptoms now of at least gastritis, will start carafate. Continue PPI and f/up with GI. Spoke with Dr Adriana Au and will plan for EGD in next week to evaluate pouch and if normal schedule for laparoscopy and likely revision of her small bowel anastomses    Labs ordered today  Follow up in 2 months or sooner if patient has questions, concerns or worsening of condition, if unable to reach our office, patient should report to the ED. Ms. Andi Zamudio has a reminder for a \"due or due soon\" health maintenance. I have asked that she contact her primary care provider for a follow-up on this health maintenance.      Total time spent with patient was 30 minutes

## 2021-02-04 NOTE — PATIENT INSTRUCTIONS
Patient Instructions 1. Remember hydration goals - minimum of 64 ounces of liquids per day (dehydration is the number one reason for hospital readmission). 2. Continue to monitor carbohydrate and protein intake you need a minimum of  Grams of protein daily- remember to keep your total carbohydrates to 50 grams or less per day for best results. 3. Continue to work towards exercise goals - 60-90 minutes, 5 times a week minimum of deliberate, aerobic exercise is the ultimate goal with strength training 2 times each week. Refer to [employer] for  information. 4. Remember to take vitamins as directed. 5. Attend support group the 2nd Thursday of each month. 6.  Constipation: Milk of Magnesia is for immediate relief only. Miralax is to be used every day if constipation is a chronic problem. 7.  Diarrhea: patients will occasionally develop lactose intolerance after surgery. Check to see if your protein shake has whey in it. If it does try a protein powder or drink that does not have whey and stop all yogurts, cheeses and milks to see if the diarrhea goes away. 8.  If you have had labs drawn. We will only call you if you have abnormal results. Otherwise you can access the lab results in \"mychart\". You will only need the access code the first time you sign on. 9.  Call us at [phone] or email us through [email or web address]" with questions,     concerns or worsening of condition, we have someone on call 24 hours a day. If you are unable to reach our office, you are to go to your Primary Care Physician or the Emergency Department. NOTE TO GASTRIC BYPASS PATIENTS:  (SAME APPLIES TO GASTRIC SLEEVE PATIENTS FOR FIRST TWO MONTHS) Remember that for the rest of your life, you are not able to take the following: 
- NSAIDs (ibuprofen, goody powder, BC powder, Motrin, Advil, Mobic, Voltaren, Excedrin, etc.) - Steroid pills or injections - Smoke (cigarettes or recreational drugs) - Alcohol Use of any of the above may cause ulcers in your stomach which may perforate causing a medical emergency and surgery. Speak to our medical staff if another medical provider requires you to take steroids or NSAIDs. Supplement Resource Guide Importance of Protein:  
Maintains lean body mass, produces antibodies to fight off infections, heals wounds, minimizes hair loss, helps to give you energy, helps with satiety, and keeping you full between meals. Importance of Calcium: 
Needed for healthy bones and teeth, normal blood clotting, and nervous system functioning, higher risk of osteoporosis and bone disease with non-compliance. Importance of Multivitamins: Many functions. Supply you with extra nutrients that you may be missing from food. May lead to iron deficiency anemia, weakness, fatigue, and many other symptoms with non-compliance. Importance of B Vitamins: 
Important for red blood cell formation, metabolism, energy, and helps to maintain a healthy nervous system. Protein Supplement Find one you like now. Use immediately after surgery. Look for: 
35-50g protein each day from your protein supplement once you reach the progression diet. 0-3 g fat per serving 0-3 g sugar per serving Protein drinks should be split in separate dosages. Recommend: Lifelong 1 year + Calcium Supplement:  
 
Start taking within a month after surgery. Look for: Calcium Citrate Plus D (1500 mg per day) Recommend: Citracal 
 
 . Avoid chocolate chewable calcium. Can use chewable bariatric or [employer] brand or similar chewable. The body cannot absorb more than 500-600 mg of calcium at a time. Take for Life Multi-vitamin Supplement:   
 
Start immediately after surgery: any complete chewable, such as: [place]s Complete chewables. Avoid [place] sours or gummies. They lack iron and other important nutrients and also have added sugar. Continue with chewable vitamin or change to adult complete multivitamin one month after surgery. Menstruating women can take a prenatal vitamin. Make sure has at least 18 mg iron and 866-774 mcg folic acid Vitamin B12, B Complex Vitamin, and Biotin Start taking within a month after surgery. Vitamin B12:  1000 mcg of Vitamin B12 three times weekly Must take sublingually (meaning you take it under your tongue) or in a liquid drop form for easy absorption. B Complex Vitamin: Take a pill or liquid drop form once daily. Biotin: This vitamin can help prevent hair loss. Recommend 5mg  
(5000 mcg) a day Biotin is Optional  
 
 
 
 
  
Learning About Physical Activity What is physical activity? Physical activity is any kind of activity that gets your body moving. The types of physical activity that can help you get fit and stay healthy include: · Aerobic or \"cardio\" activities that make your heart beat faster and make you breathe harder, such as brisk walking, riding a bike, or running. Aerobic activities strengthen your heart and lungs and build up your endurance. · Strength training activities that make your muscles work against, or \"resist,\" something, such as lifting weights or doing push-ups. These activities help tone and strengthen your muscles. · Stretches that allow you to move your joints and muscles through their full range of motion. Stretching helps you be more flexible and avoid injury. What are the benefits of physical activity? Being active is one of the best things you can do for your health. It helps you to: · Feel stronger and have more energy to do all the things you like to do. · Focus better at school or work. · Feel, think, and sleep better. · Reach and stay at a healthy weight. · Lose fat and build lean muscle. · Lower your risk for serious health problems. · Keep your bones, muscles, and joints strong. How can you make physical activity part of your life? Get at least 30 minutes of exercise on most days of the week. Walking is a good choice. You also may want to do other activities, such as running, swimming, cycling, or playing tennis or team sports. Pick activities that you likeones that make your heart beat faster, your muscles stronger, and your muscles and joints more flexible. If you find more than one thing you like doing, do them all. You don't have to do the same thing every day. Get your heart pumping every day. Any activity that makes your heart beat faster and keeps it at that rate for a while counts. Here are some great ways to get your heart beating faster: · Go for a brisk walk, run, or bike ride. · Go for a hike or swim. · Go in-line skating. · Play a game of touch football, basketball, or soccer. · Ride a bike. · Play tennis or racquetball. · Climb stairs. Even some household chores can be aerobicjust do them at a faster pace. Vacuuming, raking or mowing the lawn, sweeping the garage, and washing and waxing the car all can help get your heart rate up. Strengthen your muscles during the week. You don't have to lift heavy weights or grow big, bulky muscles to get stronger. Doing a few simple activities that make your muscles work against, or \"resist,\" something can help you get stronger. For example, you can: · Do push-ups or sit-ups, which use your own body weight as resistance. · Lift weights or dumbbells or use stretch bands at home or in a gym or community center. Stretch your muscles often. Stretching will help you as you become more active. It can help you stay flexible, loosen tight muscles, and avoid injury. It can also help improve your balance and posture and can be a great way to relax. Be sure to stretch the muscles you'll be using when you work out. It's best to warm your muscles slightly before you stretch them. Walk or do some other light aerobic activity for a few minutes, and then start stretching. When you stretch your muscles: · Do it slowly. Stretching is not about going fast or making sudden movements. · Don't push or bounce during a stretch. · Hold each stretch for at least 15 to 30 seconds, if you can. You should feel a stretch in the muscle, but not pain. · Breathe out as you do the stretch. Then breathe in as you hold the stretch. Don't hold your breath. If you're worried about how more activity might affect your health, have a checkup before you start. Follow any special advice your doctor gives you for getting a smart start. Where can you learn more? Go to http://www.gray.com/ Enter T592 in the search box to learn more about \"Learning About Physical Activity. \" Current as of: January 16, 2020               Content Version: 12.6 © 2006-2020 [employer]. Care instructions adapted under license by [employer] (which disclaims liability or warranty for this information). If you have questions about a medical condition or this instruction, always ask your healthcare professional. [address] any warranty or liability for your use of this information.

## 2021-06-09 PROBLEM — K90.9 INTESTINAL MALABSORPTION: Status: ACTIVE | Noted: 2019-11-26

## 2021-06-09 PROBLEM — G43.909 MIGRAINE HEADACHE: Status: ACTIVE | Noted: 2019-11-26

## 2021-06-09 PROBLEM — G62.9 PERIPHERAL NEUROPATHY: Status: ACTIVE | Noted: 2018-05-16

## 2021-06-09 PROBLEM — R06.01 ORTHOPNEA: Status: ACTIVE | Noted: 2019-06-28

## 2021-06-09 PROBLEM — E78.00 HYPERCHOLESTEREMIA: Status: ACTIVE | Noted: 2021-04-30

## 2021-06-09 PROBLEM — Z98.84 H/O GASTRIC BYPASS: Status: ACTIVE | Noted: 2019-02-05

## 2021-06-09 PROBLEM — G47.33 OSA (OBSTRUCTIVE SLEEP APNEA): Status: ACTIVE | Noted: 2019-06-28

## 2021-06-09 PROBLEM — F32.81 PREMENSTRUAL DYSPHORIC SYNDROME: Status: ACTIVE | Noted: 2019-06-28

## 2021-06-09 PROBLEM — F51.01 PRIMARY INSOMNIA: Status: ACTIVE | Noted: 2019-06-28

## 2021-06-09 PROBLEM — R10.2 PELVIC PAIN: Status: ACTIVE | Noted: 2018-11-12

## 2021-06-09 PROBLEM — I50.32 CHRONIC DIASTOLIC CONGESTIVE HEART FAILURE (HCC): Status: ACTIVE | Noted: 2017-05-28

## 2021-06-09 PROBLEM — E55.9 VITAMIN D DEFICIENCY: Status: ACTIVE | Noted: 2019-02-05

## 2021-06-09 PROBLEM — E11.42 DIABETIC POLYNEUROPATHY ASSOCIATED WITH TYPE 2 DIABETES MELLITUS (HCC): Status: ACTIVE | Noted: 2017-11-08

## 2021-06-09 PROBLEM — G43.009 MIGRAINE WITHOUT AURA AND WITHOUT STATUS MIGRAINOSUS, NOT INTRACTABLE: Status: ACTIVE | Noted: 2017-11-08

## 2021-06-09 PROBLEM — F33.1 MODERATE EPISODE OF RECURRENT MAJOR DEPRESSIVE DISORDER (HCC): Status: ACTIVE | Noted: 2017-11-08

## 2021-06-09 PROBLEM — N89.8 VAGINAL DISCHARGE: Status: ACTIVE | Noted: 2017-05-28

## 2021-06-09 PROBLEM — M19.90 ARTHRITIS: Status: ACTIVE | Noted: 2019-11-26

## 2021-06-09 PROBLEM — E11.9 DIABETES MELLITUS (HCC): Status: ACTIVE | Noted: 2017-05-28

## 2021-06-09 PROBLEM — Z72.0 TOBACCO USE: Status: ACTIVE | Noted: 2018-11-12

## 2021-06-09 PROBLEM — D57.3 SICKLE CELL TRAIT (HCC): Status: ACTIVE | Noted: 2019-11-26

## 2021-08-03 PROBLEM — K21.9 ACID REFLUX: Status: RESOLVED | Noted: 2018-05-16 | Resolved: 2021-08-03

## 2022-03-18 PROBLEM — G62.9 PERIPHERAL NEUROPATHY: Status: ACTIVE | Noted: 2018-05-16

## 2022-03-18 PROBLEM — G56.21 CUBITAL TUNNEL SYNDROME ON RIGHT: Status: ACTIVE | Noted: 2018-05-16

## 2022-03-19 PROBLEM — G56.00 CTS (CARPAL TUNNEL SYNDROME): Status: ACTIVE | Noted: 2018-05-17

## 2022-03-19 PROBLEM — D57.3 SICKLE CELL TRAIT (HCC): Status: ACTIVE | Noted: 2019-11-26

## 2022-03-19 PROBLEM — N89.8 VAGINAL DISCHARGE: Status: ACTIVE | Noted: 2017-05-28

## 2022-03-19 PROBLEM — G47.33 OSA (OBSTRUCTIVE SLEEP APNEA): Status: ACTIVE | Noted: 2019-06-28

## 2022-03-19 PROBLEM — R10.2 PELVIC PAIN: Status: ACTIVE | Noted: 2018-11-12

## 2022-03-19 PROBLEM — R06.01 ORTHOPNEA: Status: ACTIVE | Noted: 2019-06-28

## 2022-03-19 PROBLEM — F33.1 MODERATE EPISODE OF RECURRENT MAJOR DEPRESSIVE DISORDER (HCC): Status: ACTIVE | Noted: 2017-11-08

## 2022-03-19 PROBLEM — K90.9 INTESTINAL MALABSORPTION: Status: ACTIVE | Noted: 2019-11-26

## 2022-03-19 PROBLEM — Z72.0 TOBACCO USE: Status: ACTIVE | Noted: 2018-11-12

## 2022-03-19 PROBLEM — I50.32 CHRONIC DIASTOLIC CONGESTIVE HEART FAILURE (HCC): Status: ACTIVE | Noted: 2017-05-28

## 2022-03-19 PROBLEM — R10.84 GENERALIZED ABDOMINAL PAIN: Status: ACTIVE | Noted: 2020-03-18

## 2022-03-19 PROBLEM — G43.009 MIGRAINE WITHOUT AURA AND WITHOUT STATUS MIGRAINOSUS, NOT INTRACTABLE: Status: ACTIVE | Noted: 2017-11-08

## 2022-03-19 PROBLEM — M19.90 ARTHRITIS: Status: ACTIVE | Noted: 2019-11-26

## 2022-03-19 PROBLEM — G56.01 CARPAL TUNNEL SYNDROME OF RIGHT WRIST: Status: ACTIVE | Noted: 2018-05-16

## 2022-03-19 PROBLEM — E78.00 HYPERCHOLESTEREMIA: Status: ACTIVE | Noted: 2021-04-30

## 2022-03-19 PROBLEM — G43.909 MIGRAINE HEADACHE: Status: ACTIVE | Noted: 2019-11-26

## 2022-03-19 PROBLEM — F51.01 PRIMARY INSOMNIA: Status: ACTIVE | Noted: 2019-06-28

## 2022-03-19 PROBLEM — Z98.890 S/P LAPAROSCOPIC PROCEDURE: Status: ACTIVE | Noted: 2020-05-26

## 2022-03-19 PROBLEM — F32.A DEPRESSION: Status: ACTIVE | Noted: 2018-05-16

## 2022-03-19 PROBLEM — Z98.84 H/O GASTRIC BYPASS: Status: ACTIVE | Noted: 2019-02-05

## 2022-03-19 PROBLEM — E11.9 DIABETES MELLITUS (HCC): Status: ACTIVE | Noted: 2017-05-28

## 2022-03-20 PROBLEM — E11.42 DIABETIC POLYNEUROPATHY ASSOCIATED WITH TYPE 2 DIABETES MELLITUS (HCC): Status: ACTIVE | Noted: 2017-11-08

## 2022-03-20 PROBLEM — E55.9 VITAMIN D DEFICIENCY: Status: ACTIVE | Noted: 2019-02-05

## 2022-03-20 PROBLEM — F41.9 ANXIETY: Status: ACTIVE | Noted: 2018-05-16

## 2022-03-20 PROBLEM — F32.81 PREMENSTRUAL DYSPHORIC SYNDROME: Status: ACTIVE | Noted: 2019-06-28

## 2023-03-30 ENCOUNTER — TELEPHONE (OUTPATIENT)
Age: 48
End: 2023-03-30

## 2023-11-07 NOTE — ANESTHESIA PREPROCEDURE EVALUATION
Relevant Problems   No relevant active problems       Anesthetic History   No history of anesthetic complications            Review of Systems / Medical History  Patient summary reviewed, nursing notes reviewed and pertinent labs reviewed    Pulmonary        Sleep apnea    Asthma : well controlled       Neuro/Psych         Psychiatric history  Pertinent negatives: No CVA   Cardiovascular    Hypertension                Comments: Pt saw cardiologist for increased shortness of breath. He felt it was due to her anemia. She failed stress test but subsequently received an iron infusion. Shortness of breath much improved. Dr. Rolando Whiting and patient feel this surgery is urgent. Patient understands risks.    GI/Hepatic/Renal     GERD: poorly controlled           Endo/Other    Diabetes: well controlled, type 2, using insulin    Obesity, arthritis and anemia     Other Findings              Physical Exam    Airway  Mallampati: I  TM Distance: 4 - 6 cm  Neck ROM: normal range of motion   Mouth opening: Normal     Cardiovascular    Rhythm: regular  Rate: normal         Dental  No notable dental hx       Pulmonary  Breath sounds clear to auscultation               Abdominal  GI exam deferred       Other Findings            Anesthetic Plan    ASA: 3  Anesthesia type: general          Induction: Intravenous and RSI  Anesthetic plan and risks discussed with: Patient
Ambulatory

## (undated) DEVICE — KENDALL SCD EXPRESS SLEEVES, KNEE LENGTH, MEDIUM: Brand: KENDALL SCD

## (undated) DEVICE — PREP SKN PREVAIL 40ML APPL --

## (undated) DEVICE — SKIN MARKER,REGULAR TIP WITH RULER AND LABELS: Brand: DEVON

## (undated) DEVICE — STERILE POLYISOPRENE POWDER-FREE SURGICAL GLOVES WITH EMOLLIENT COATING: Brand: PROTEXIS

## (undated) DEVICE — STERILE POLYISOPRENE POWDER-FREE SURGICAL GLOVES: Brand: PROTEXIS

## (undated) DEVICE — AGENT HEMSTAT W6XL9IN OXIDIZED REGENERATED CELOS ABSRB FOR

## (undated) DEVICE — MAJ-1414 SINGLE USE ADPATER BIOPSY VALV: Brand: SINGLE USE ADAPTOR BIOPSY VALVE

## (undated) DEVICE — GOWN ISOLATN REG BLU POLY UNISX W/ THMB LOOP

## (undated) DEVICE — TROCAR: Brand: KII® SLEEVE

## (undated) DEVICE — DEVON™ KNEE AND BODY STRAP 60" X 3" (1.5 M X 7.6 CM): Brand: DEVON

## (undated) DEVICE — MASTISOL ADHESIVE LIQ 2/3ML

## (undated) DEVICE — (D)STRIP SKN CLSR 0.5X4IN WHT --

## (undated) DEVICE — LAPAROSCOPIC TROCAR SLEEVE/SINGLE USE: Brand: KII® OPTICAL ACCESS SYSTEM

## (undated) DEVICE — STRIP SKIN CLSR W1XL5IN NYL REINF CURAD

## (undated) DEVICE — SUT MONOCRYL PLUS UD 4-0 --

## (undated) DEVICE — VISUALIZATION SYSTEM: Brand: CLEARIFY

## (undated) DEVICE — [HIGH FLOW INSUFFLATOR,  DO NOT USE IF PACKAGE IS DAMAGED,  KEEP DRY,  KEEP AWAY FROM SUNLIGHT,  PROTECT FROM HEAT AND RADIOACTIVE SOURCES.]: Brand: PNEUMOSURE

## (undated) DEVICE — INSUFFLATION NEEDLE TO ESTABLISH PNEUMOPERITONEUM.: Brand: INSUFFLATION NEEDLE

## (undated) DEVICE — (D)PREP SKN CHLRAPRP APPL 26ML -- CONVERT TO ITEM 371833

## (undated) DEVICE — Device

## (undated) DEVICE — BARIATRIC: Brand: MEDLINE INDUSTRIES, INC.

## (undated) DEVICE — REM POLYHESIVE ADULT PATIENT RETURN ELECTRODE: Brand: VALLEYLAB

## (undated) DEVICE — ZIMMER® STERILE DISPOSABLE TOURNIQUET CUFF WITH PROTECTIVE SLEEVE AND PLC, SINGLE PORT, SINGLE BLADDER, 18 IN. (46 CM)

## (undated) DEVICE — GARMENT,MEDLINE,DVT,INT,CALF,MED, GEN2: Brand: MEDLINE

## (undated) DEVICE — SUTURE ETHIB EXCL BR GRN TAPR PT 2-0 30 X563H X563H

## (undated) DEVICE — MEDI-VAC NON-CONDUCTIVE SUCTION TUBING: Brand: CARDINAL HEALTH

## (undated) DEVICE — PACK PROCEDURE SURG EXTREMITY CUST

## (undated) DEVICE — SPONGE GZ W4XL4IN COT 12 PLY TYP VII WVN C FLD DSGN

## (undated) DEVICE — SOL IRRIGATION INJ NACL 0.9% 500ML BTL

## (undated) DEVICE — NEEDLE HYPO 25GA L1.5IN BVL ORIENTED ECLIPSE

## (undated) DEVICE — BANDAGE COMPR W4INXL5YD ELAS CLP CLSR

## (undated) DEVICE — CTS RELIEF KIT, CARPAL TUNNEL SYNDROME RELIEF KIT: Brand: CTS RELIEF KIT